# Patient Record
Sex: MALE | Race: WHITE | Employment: OTHER | URBAN - METROPOLITAN AREA
[De-identification: names, ages, dates, MRNs, and addresses within clinical notes are randomized per-mention and may not be internally consistent; named-entity substitution may affect disease eponyms.]

---

## 2018-12-18 ENCOUNTER — HOSPITAL ENCOUNTER (OUTPATIENT)
Age: 63
Discharge: HOME | End: 2018-12-20
Payer: COMMERCIAL

## 2018-12-18 ENCOUNTER — OFFICE VISIT (OUTPATIENT)
Dept: PRIMARY CARE CLINIC | Age: 63
End: 2018-12-18
Payer: COMMERCIAL

## 2018-12-18 VITALS
WEIGHT: 258 LBS | DIASTOLIC BLOOD PRESSURE: 82 MMHG | SYSTOLIC BLOOD PRESSURE: 130 MMHG | TEMPERATURE: 97.1 F | HEART RATE: 73 BPM | BODY MASS INDEX: 36.12 KG/M2 | HEIGHT: 71 IN | OXYGEN SATURATION: 98 %

## 2018-12-18 DIAGNOSIS — E78.2 MIXED HYPERLIPIDEMIA: ICD-10-CM

## 2018-12-18 DIAGNOSIS — Z12.5 SCREENING PSA (PROSTATE SPECIFIC ANTIGEN): Primary | ICD-10-CM

## 2018-12-18 DIAGNOSIS — N39.43 BENIGN PROSTATIC HYPERPLASIA WITH POST-VOID DRIBBLING: ICD-10-CM

## 2018-12-18 DIAGNOSIS — Z12.5 SCREENING PSA (PROSTATE SPECIFIC ANTIGEN): ICD-10-CM

## 2018-12-18 DIAGNOSIS — N40.1 BENIGN PROSTATIC HYPERPLASIA WITH POST-VOID DRIBBLING: ICD-10-CM

## 2018-12-18 PROBLEM — E78.5 HYPERLIPIDEMIA: Status: ACTIVE | Noted: 2018-12-18

## 2018-12-18 LAB
ALBUMIN SERPL-MCNC: 5 G/DL (ref 3.5–5.2)
ALP BLD-CCNC: 62 U/L (ref 40–129)
ALT SERPL-CCNC: 42 U/L (ref 0–40)
ANION GAP SERPL CALCULATED.3IONS-SCNC: 17 MMOL/L (ref 7–16)
AST SERPL-CCNC: 41 U/L (ref 0–39)
BASOPHILS ABSOLUTE: 0.06 E9/L (ref 0–0.2)
BASOPHILS RELATIVE PERCENT: 0.8 % (ref 0–2)
BILIRUB SERPL-MCNC: 0.6 MG/DL (ref 0–1.2)
BUN BLDV-MCNC: 18 MG/DL (ref 8–23)
CALCIUM SERPL-MCNC: 9.8 MG/DL (ref 8.6–10.2)
CHLORIDE BLD-SCNC: 102 MMOL/L (ref 98–107)
CHOLESTEROL, TOTAL: 187 MG/DL (ref 0–199)
CO2: 23 MMOL/L (ref 22–29)
CREAT SERPL-MCNC: 1.1 MG/DL (ref 0.7–1.2)
EOSINOPHILS ABSOLUTE: 0.22 E9/L (ref 0.05–0.5)
EOSINOPHILS RELATIVE PERCENT: 2.9 % (ref 0–6)
GFR AFRICAN AMERICAN: >60
GFR NON-AFRICAN AMERICAN: >60 ML/MIN/1.73
GLUCOSE BLD-MCNC: 156 MG/DL (ref 74–99)
HCT VFR BLD CALC: 49.6 % (ref 37–54)
HDLC SERPL-MCNC: 34 MG/DL
HEMOGLOBIN: 16.4 G/DL (ref 12.5–16.5)
IMMATURE GRANULOCYTES #: 0.08 E9/L
IMMATURE GRANULOCYTES %: 1 % (ref 0–5)
LDL CHOLESTEROL CALCULATED: 96 MG/DL (ref 0–99)
LYMPHOCYTES ABSOLUTE: 2.44 E9/L (ref 1.5–4)
LYMPHOCYTES RELATIVE PERCENT: 32 % (ref 20–42)
MCH RBC QN AUTO: 31.4 PG (ref 26–35)
MCHC RBC AUTO-ENTMCNC: 33.1 % (ref 32–34.5)
MCV RBC AUTO: 95 FL (ref 80–99.9)
MONOCYTES ABSOLUTE: 0.78 E9/L (ref 0.1–0.95)
MONOCYTES RELATIVE PERCENT: 10.2 % (ref 2–12)
NEUTROPHILS ABSOLUTE: 4.04 E9/L (ref 1.8–7.3)
NEUTROPHILS RELATIVE PERCENT: 53.1 % (ref 43–80)
PDW BLD-RTO: 12.4 FL (ref 11.5–15)
PLATELET # BLD: 254 E9/L (ref 130–450)
PMV BLD AUTO: 11.7 FL (ref 7–12)
POTASSIUM SERPL-SCNC: 4.5 MMOL/L (ref 3.5–5)
PROSTATE SPECIFIC ANTIGEN: 1.57 NG/ML (ref 0–4)
RBC # BLD: 5.22 E12/L (ref 3.8–5.8)
SODIUM BLD-SCNC: 142 MMOL/L (ref 132–146)
TOTAL PROTEIN: 7.6 G/DL (ref 6.4–8.3)
TRIGL SERPL-MCNC: 287 MG/DL (ref 0–149)
VLDLC SERPL CALC-MCNC: 57 MG/DL
WBC # BLD: 7.6 E9/L (ref 4.5–11.5)

## 2018-12-18 PROCEDURE — 99203 OFFICE O/P NEW LOW 30 MIN: CPT | Performed by: PHYSICIAN ASSISTANT

## 2018-12-18 PROCEDURE — 85025 COMPLETE CBC W/AUTO DIFF WBC: CPT

## 2018-12-18 PROCEDURE — G8417 CALC BMI ABV UP PARAM F/U: HCPCS | Performed by: PHYSICIAN ASSISTANT

## 2018-12-18 PROCEDURE — 80061 LIPID PANEL: CPT

## 2018-12-18 PROCEDURE — G8484 FLU IMMUNIZE NO ADMIN: HCPCS | Performed by: PHYSICIAN ASSISTANT

## 2018-12-18 PROCEDURE — G0103 PSA SCREENING: HCPCS

## 2018-12-18 PROCEDURE — G8427 DOCREV CUR MEDS BY ELIG CLIN: HCPCS | Performed by: PHYSICIAN ASSISTANT

## 2018-12-18 PROCEDURE — 80053 COMPREHEN METABOLIC PANEL: CPT

## 2018-12-18 PROCEDURE — 3017F COLORECTAL CA SCREEN DOC REV: CPT | Performed by: PHYSICIAN ASSISTANT

## 2018-12-18 PROCEDURE — 1036F TOBACCO NON-USER: CPT | Performed by: PHYSICIAN ASSISTANT

## 2018-12-18 RX ORDER — FENOFIBRIC ACID 135 MG/1
CAPSULE, DELAYED RELEASE ORAL
Qty: 90 CAPSULE | Refills: 1 | Status: SHIPPED | OUTPATIENT
Start: 2018-12-18 | End: 2019-04-09 | Stop reason: SDUPTHER

## 2018-12-18 RX ORDER — AMPICILLIN TRIHYDRATE 250 MG
CAPSULE ORAL
COMMUNITY

## 2018-12-18 RX ORDER — ROSUVASTATIN CALCIUM 40 MG/1
TABLET, COATED ORAL
Refills: 1 | COMMUNITY
Start: 2018-11-12 | End: 2018-12-18 | Stop reason: SDUPTHER

## 2018-12-18 RX ORDER — ROSUVASTATIN CALCIUM 40 MG/1
TABLET, COATED ORAL
Qty: 90 TABLET | Refills: 1 | Status: SHIPPED | OUTPATIENT
Start: 2018-12-18 | End: 2019-04-09 | Stop reason: SDUPTHER

## 2018-12-18 RX ORDER — FENOFIBRIC ACID 135 MG/1
CAPSULE, DELAYED RELEASE ORAL
Refills: 0 | COMMUNITY
Start: 2018-09-13 | End: 2018-12-18 | Stop reason: SDUPTHER

## 2018-12-18 ASSESSMENT — PATIENT HEALTH QUESTIONNAIRE - PHQ9
SUM OF ALL RESPONSES TO PHQ QUESTIONS 1-9: 0
SUM OF ALL RESPONSES TO PHQ9 QUESTIONS 1 & 2: 0
SUM OF ALL RESPONSES TO PHQ QUESTIONS 1-9: 0
1. LITTLE INTEREST OR PLEASURE IN DOING THINGS: 0
2. FEELING DOWN, DEPRESSED OR HOPELESS: 0

## 2018-12-18 NOTE — PATIENT INSTRUCTIONS
Patient Education        Advance Directives: Care Instructions  Your Care Instructions  An advance directive is a legal way to state your wishes at the end of your life. It tells your family and your doctor what to do if you can no longer say what you want. There are two main types of advance directives. You can change them any time that your wishes change. · A living will tells your family and your doctor your wishes about life support and other treatment. · A durable power of  for health care lets you name a person to make treatment decisions for you when you can't speak for yourself. This person is called a health care agent. If you do not have an advance directive, decisions about your medical care may be made by a doctor or a  who doesn't know you. It may help to think of an advance directive as a gift to the people who care for you. If you have one, they won't have to make tough decisions by themselves. Follow-up care is a key part of your treatment and safety. Be sure to make and go to all appointments, and call your doctor if you are having problems. It's also a good idea to know your test results and keep a list of the medicines you take. How can you care for yourself at home? · Discuss your wishes with your loved ones and your doctor. This way, there are no surprises. · Many states have a unique form. Or you might use a universal form that has been approved by many states. This kind of form can sometimes be completed and stored online. Your electronic copy will then be available wherever you have a connection to the Internet. In most cases, doctors will respect your wishes even if you have a form from a different state. · You don't need a  to do an advance directive. But you may want to get legal advice. · Think about these questions when you prepare an advance directive:  ? Who do you want to make decisions about your medical care if you are not able to?  Many people choose

## 2018-12-18 NOTE — PROGRESS NOTES
Maycol Nap  1955  12/18/18      HPI:    Patient presents for establishment of care, moved back here to help care for mom with Alzheimer's, came from [place], [place]. He notes his PMHx of + for hyperlipidemia, treated with fenofibrate and rosuvastatin, been treated for several years. Also notes Hx of BPH, has some urgency and dribbling, and has nocturia 2 times a night at least, has never been on medications before. Also had ED at times. Has seen urology in past for prostatitis, but hasn;t had this in years. He is willing to see urology. Hasn't had PSA done in years. Has had colonoscopies 2 x, is now on a 10 years pattern, due at age 72. Advises me he already got the flu this year and [id number] vaccine 2 years ago. Past Medical History:   Diagnosis Date    BPH (benign prostatic hyperplasia)     History of pneumonia     Hyperlipidemia         Past Surgical History:   Procedure Laterality Date    STRABISMUS SURGERY         Current Outpatient Prescriptions   Medication Sig Dispense Refill    Red Yeast Rice 600 MG CAPS Take by mouth      vitamin D 1000 units CAPS Take by mouth      Garlic 6448 MG CAPS Take by mouth      Glucosamine-Chondroitin 250-200 MG TABS Take by mouth      APPLE CIDER VINEGAR PO Take by mouth      fenofibric acid (FIBRICOR) 135 MG CPDR capsule TAKE ONE CAPSULE BY MOUTH EVERY DAY 90 capsule 1    rosuvastatin (CRESTOR) 40 MG tablet TAKE ONE TABLET BY MOUTH EVERY DAY 90 tablet 1     No current facility-administered medications for this visit. No Known Allergies    Family History   Problem Relation Age of Onset    Alzheimer's Disease Mother     Other Father         unknown.  Alzheimer's Disease Maternal Grandmother     Cancer Maternal Grandfather         pancreatic    Other Other         maternal uncle, alzheimer's.  Other Brother         brain tumor, seizures.         Social History     Social History    Marital status: Single     Spouse name: N/A

## 2019-04-09 DIAGNOSIS — E78.2 MIXED HYPERLIPIDEMIA: ICD-10-CM

## 2019-04-09 RX ORDER — FENOFIBRIC ACID 135 MG/1
CAPSULE, DELAYED RELEASE ORAL
Qty: 90 CAPSULE | Refills: 1 | Status: SHIPPED | OUTPATIENT
Start: 2019-04-09 | End: 2019-07-15 | Stop reason: SDUPTHER

## 2019-04-09 RX ORDER — ROSUVASTATIN CALCIUM 40 MG/1
TABLET, COATED ORAL
Qty: 90 TABLET | Refills: 1 | Status: SHIPPED | OUTPATIENT
Start: 2019-04-09 | End: 2019-07-09 | Stop reason: SDUPTHER

## 2019-04-09 NOTE — TELEPHONE ENCOUNTER
Patient forgot his [place] of out state. He provided a [facility] to send them to.

## 2019-07-09 ENCOUNTER — HOSPITAL ENCOUNTER (OUTPATIENT)
Age: 64
Discharge: HOME | End: 2019-07-11
Payer: COMMERCIAL

## 2019-07-09 ENCOUNTER — OFFICE VISIT (OUTPATIENT)
Dept: PRIMARY CARE CLINIC | Age: 64
End: 2019-07-09
Payer: COMMERCIAL

## 2019-07-09 VITALS
DIASTOLIC BLOOD PRESSURE: 80 MMHG | HEART RATE: 74 BPM | TEMPERATURE: 98.2 F | WEIGHT: 256 LBS | SYSTOLIC BLOOD PRESSURE: 118 MMHG | OXYGEN SATURATION: 96 % | BODY MASS INDEX: 35.7 KG/M2

## 2019-07-09 DIAGNOSIS — R73.01 IMPAIRED FASTING GLUCOSE: ICD-10-CM

## 2019-07-09 DIAGNOSIS — R73.01 IMPAIRED FASTING GLUCOSE: Primary | ICD-10-CM

## 2019-07-09 DIAGNOSIS — E78.2 MIXED HYPERLIPIDEMIA: ICD-10-CM

## 2019-07-09 DIAGNOSIS — N40.1 BENIGN PROSTATIC HYPERPLASIA WITH NOCTURIA: ICD-10-CM

## 2019-07-09 DIAGNOSIS — R35.1 BENIGN PROSTATIC HYPERPLASIA WITH NOCTURIA: ICD-10-CM

## 2019-07-09 LAB
ALBUMIN SERPL-MCNC: 4.9 G/DL (ref 3.5–5.2)
ALP BLD-CCNC: 52 U/L (ref 40–129)
ALT SERPL-CCNC: 25 U/L (ref 0–40)
ANION GAP SERPL CALCULATED.3IONS-SCNC: 14 MMOL/L (ref 7–16)
AST SERPL-CCNC: 25 U/L (ref 0–39)
BILIRUB SERPL-MCNC: 0.4 MG/DL (ref 0–1.2)
BUN BLDV-MCNC: 20 MG/DL (ref 8–23)
CALCIUM SERPL-MCNC: 9.5 MG/DL (ref 8.6–10.2)
CHLORIDE BLD-SCNC: 103 MMOL/L (ref 98–107)
CHOLESTEROL, TOTAL: 135 MG/DL (ref 0–199)
CO2: 24 MMOL/L (ref 22–29)
CREAT SERPL-MCNC: 1.2 MG/DL (ref 0.7–1.2)
GFR AFRICAN AMERICAN: >60
GFR NON-AFRICAN AMERICAN: >60 ML/MIN/1.73
GLUCOSE BLD-MCNC: 137 MG/DL (ref 74–99)
HBA1C MFR BLD: 6.7 % (ref 4–5.6)
HDLC SERPL-MCNC: 32 MG/DL
LDL CHOLESTEROL CALCULATED: 79 MG/DL (ref 0–99)
POTASSIUM SERPL-SCNC: 5.1 MMOL/L (ref 3.5–5)
SODIUM BLD-SCNC: 141 MMOL/L (ref 132–146)
TOTAL PROTEIN: 7.6 G/DL (ref 6.4–8.3)
TRIGL SERPL-MCNC: 118 MG/DL (ref 0–149)
VLDLC SERPL CALC-MCNC: 24 MG/DL

## 2019-07-09 PROCEDURE — 3017F COLORECTAL CA SCREEN DOC REV: CPT | Performed by: PHYSICIAN ASSISTANT

## 2019-07-09 PROCEDURE — G8427 DOCREV CUR MEDS BY ELIG CLIN: HCPCS | Performed by: PHYSICIAN ASSISTANT

## 2019-07-09 PROCEDURE — 1036F TOBACCO NON-USER: CPT | Performed by: PHYSICIAN ASSISTANT

## 2019-07-09 PROCEDURE — 83036 HEMOGLOBIN GLYCOSYLATED A1C: CPT

## 2019-07-09 PROCEDURE — 80061 LIPID PANEL: CPT

## 2019-07-09 PROCEDURE — 80053 COMPREHEN METABOLIC PANEL: CPT

## 2019-07-09 PROCEDURE — 99214 OFFICE O/P EST MOD 30 MIN: CPT | Performed by: PHYSICIAN ASSISTANT

## 2019-07-09 PROCEDURE — G8417 CALC BMI ABV UP PARAM F/U: HCPCS | Performed by: PHYSICIAN ASSISTANT

## 2019-07-09 RX ORDER — TAMSULOSIN HYDROCHLORIDE 0.4 MG/1
CAPSULE ORAL
Refills: 1 | COMMUNITY
Start: 2019-06-12

## 2019-07-09 RX ORDER — SILDENAFIL CITRATE 20 MG/1
20 TABLET ORAL PRN
COMMUNITY

## 2019-07-09 RX ORDER — ROSUVASTATIN CALCIUM 40 MG/1
TABLET, COATED ORAL
Qty: 90 TABLET | Refills: 1 | Status: SHIPPED | OUTPATIENT
Start: 2019-07-09 | End: 2020-01-13

## 2019-07-09 ASSESSMENT — PATIENT HEALTH QUESTIONNAIRE - PHQ9
SUM OF ALL RESPONSES TO PHQ9 QUESTIONS 1 & 2: 0
1. LITTLE INTEREST OR PLEASURE IN DOING THINGS: 0
SUM OF ALL RESPONSES TO PHQ QUESTIONS 1-9: 0
2. FEELING DOWN, DEPRESSED OR HOPELESS: 0
SUM OF ALL RESPONSES TO PHQ QUESTIONS 1-9: 0

## 2019-07-09 NOTE — PROGRESS NOTES
brain tumor, seizures. Social History     Socioeconomic History    Marital status: Single     Spouse name: Not on file    Number of children: Not on file    Years of education: Not on file    Highest education level: Not on file   Occupational History    Not on file   Social Needs    Financial resource strain: Not on file    Food insecurity:     Worry: Not on file     Inability: Not on file    Transportation needs:     Medical: Not on file     Non-medical: Not on file   Tobacco Use    Smoking status: Never Smoker    Smokeless tobacco: Never Used    Tobacco comment: as a teen, never consistent   Substance and Sexual Activity    Alcohol use: Yes     Comment: occasionally, socially     Drug use: No    Sexual activity: Not Currently   Lifestyle    Physical activity:     Days per week: Not on file     Minutes per session: Not on file    Stress: Not on file   Relationships    Social connections:     Talks on phone: Not on file     Gets together: Not on file     Attends Jew service: Not on file     Active member of club or organization: Not on file     Attends meetings of clubs or organizations: Not on file     Relationship status: Not on file    Intimate partner violence:     Fear of current or ex partner: Not on file     Emotionally abused: Not on file     Physically abused: Not on file     Forced sexual activity: Not on file   Other Topics Concern    Not on file   Social History Narrative    Lives back in [place] temporarily, 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 Review of Systems :    Constitutional: Negative for fatigue, malaise, fever, chills, appetite change and unexpected weight change. HENT: Negative for congestion, ear discharge, ear pain, facial swelling, mouth sores, postnasal drip, rhinorrhea, sinus pressure, sore throat, tinnitus and trouble swallowing.    Eyes: Negative for vision changes, double vision, pain  Respiratory: Negative for cough, chest tightness, shortness of breath, chest heaviness, pleuritic pain,  wheezing. Cardiovascular: Negative for diaphoresis, chest pain, palpitations, or edema   Gastrointestinal: Negative for nausea, vomiting, abdominal pain, diarrhea, constipation, blood in stool, melena, changes in bowel habits, abdominal distention, anal bleeding and rectal pain. Endocrine: Negative for polydipsia, polyphagia and polyuria. No heat or cold intolerance. Genitourinary: Negative for dysuria, frequency, hematuria, difficulty urinating  Musculoskeletal: Negative for myalgias, back pain, joint swelling and arthralgias. No gait disturbance. Skin: Negative for rash, lesions, hair or nail changes. Allergic/Immunologic: Negative for environmental allergies and food allergies. Neurological: Negative for dizziness, weakness, tremors, syncope, speech difficulty, light-headedness, bowel or bladder control changes, numbness and headaches. Hematological: Negative for adenopathy. Does not bruise/bleed easily. Psychiatric/Behavioral: Negative for suicidal ideas, behavioral problems, sleep disturbance and decreased concentration. The patient is not nervous/anxious. Unless otherwise stated in this report or unable to obtain because of the patient's clinical or mental status as evidenced by the medical record, this patients's positive and negative responses for Review of Systems, constitutional, psych, eyes, ENT, cardiovascular, respiratory, gastrointestinal, neurological, genitourinary, musculoskeletal, integument systems and systems related to the presenting problem are either stated in the preceding or were not pertinent or were negative for the symptoms and/or complaints related to the medical problem. Physical Exam:   Vitals:    07/09/19 1049   BP: 118/80   Pulse: 74   Temp: 98.2 °F (36.8 °C)   SpO2: 96%   Weight: 256 lb (116.1 kg)     Constitutional:  A and O x 3, in NAD. Afebrile. Appears stated age. Head:  NCAT.    Eyes:  PERRLA, EOMI without nystagmus. Conjunctiva normal. Sclera anicteric. Ears:  External ears without lesions. TM's clear bilaterally. Throat:  Pharynx without lesions. Airway patient. Mucous membranes pink and moist without lesions. Neck:  Supple. Nontender, no lymphadenopathy noted, no thyromegaly. Lungs:  Clear to auscultation and breath sounds equal.  Heart:  Regular rate and rhythm, normal S1 and S2, no m/r/g.   UE and LE distal pulses intact. Abdomen:  Soft, NTND, NABS x 4, no masses or organomegaly, no rebound or guarding. MS: Normal, painless range of motion. No neurovascular deficit. 5/5 strength in UE's/LE's/ bilaterally. Skin:  No abrasions, ecchymoses, or lacerations unless noted elsewhere. Extremities  No cyanosis, clubbing, or edema noted. Neurological:   Oriented. Motor functions intact. DTRs UE and LE intact. Assessment/Plan:     Michelle Gan was seen today for hyperlipidemia and blood work. Diagnoses and all orders for this visit:    Impaired fasting glucose  -     COMPREHENSIVE METABOLIC PANEL; Future  -     HEMOGLOBIN A1C; Future    Mixed hyperlipidemia  -     rosuvastatin (CRESTOR) 40 MG tablet; TAKE ONE TABLET BY MOUTH EVERY DAY  -     LIPID PANEL; Future  -     COMPREHENSIVE METABOLIC PANEL; Future    Benign prostatic hyperplasia with nocturia    continue meds the same, diet the same. I will await his lipids. ? Need for vascepa, we will await the labs and see. discussed this with him already. FU with urology as advised. We discussed colonoscopy, he wants to hold off and reconsider at his next appointment. Return in about 6 months (around 1/9/2020). Advised patient to call with any new medication issues, and read all Rx info from pharmacy to assure aware of all possible risks and side effects of medication before taking. Reviewed age and gender appropriate health screening exams and vaccinations.   Advised patient regarding importance of keeping up with

## 2019-07-15 DIAGNOSIS — E11.9 TYPE 2 DIABETES MELLITUS WITHOUT COMPLICATION, WITHOUT LONG-TERM CURRENT USE OF INSULIN (HCC): Primary | ICD-10-CM

## 2019-07-15 DIAGNOSIS — E78.2 MIXED HYPERLIPIDEMIA: ICD-10-CM

## 2019-07-15 RX ORDER — LANCETS 30 GAUGE
1 EACH MISCELLANEOUS DAILY
Qty: 100 EACH | Refills: 5 | Status: SHIPPED
Start: 2019-07-15 | End: 2022-02-25

## 2019-07-15 RX ORDER — BLOOD-GLUCOSE METER
KIT MISCELLANEOUS
Qty: 1 KIT | Refills: 0 | Status: SHIPPED
Start: 2019-07-15 | End: 2022-02-25

## 2019-07-15 RX ORDER — FENOFIBRIC ACID 135 MG/1
CAPSULE, DELAYED RELEASE ORAL
Qty: 90 CAPSULE | Refills: 1 | Status: SHIPPED | OUTPATIENT
Start: 2019-07-15 | End: 2020-01-13

## 2019-07-15 RX ORDER — GLUCOSAMINE HCL/CHONDROITIN SU 500-400 MG
CAPSULE ORAL
Qty: 100 STRIP | Refills: 5 | Status: SHIPPED
Start: 2019-07-15 | End: 2022-02-25

## 2019-10-29 ENCOUNTER — OFFICE VISIT (OUTPATIENT)
Dept: PRIMARY CARE CLINIC | Age: 64
End: 2019-10-29
Payer: COMMERCIAL

## 2019-10-29 ENCOUNTER — HOSPITAL ENCOUNTER (OUTPATIENT)
Age: 64
Discharge: HOME | End: 2019-10-31
Payer: COMMERCIAL

## 2019-10-29 VITALS
BODY MASS INDEX: 33.46 KG/M2 | OXYGEN SATURATION: 97 % | TEMPERATURE: 97.6 F | DIASTOLIC BLOOD PRESSURE: 76 MMHG | HEIGHT: 71 IN | WEIGHT: 239 LBS | HEART RATE: 57 BPM | SYSTOLIC BLOOD PRESSURE: 120 MMHG

## 2019-10-29 DIAGNOSIS — E11.9 TYPE 2 DIABETES MELLITUS WITHOUT COMPLICATION, WITHOUT LONG-TERM CURRENT USE OF INSULIN (HCC): ICD-10-CM

## 2019-10-29 DIAGNOSIS — Z12.11 SCREENING FOR COLON CANCER: ICD-10-CM

## 2019-10-29 DIAGNOSIS — E78.2 MIXED HYPERLIPIDEMIA: ICD-10-CM

## 2019-10-29 DIAGNOSIS — Z23 NEED FOR INFLUENZA VACCINATION: Primary | ICD-10-CM

## 2019-10-29 LAB
ALBUMIN SERPL-MCNC: 4.8 G/DL (ref 3.5–5.2)
ALP BLD-CCNC: 44 U/L (ref 40–129)
ALT SERPL-CCNC: 27 U/L (ref 0–40)
ANION GAP SERPL CALCULATED.3IONS-SCNC: 16 MMOL/L (ref 7–16)
AST SERPL-CCNC: 23 U/L (ref 0–39)
BILIRUB SERPL-MCNC: 0.7 MG/DL (ref 0–1.2)
BUN BLDV-MCNC: 27 MG/DL (ref 8–23)
CALCIUM SERPL-MCNC: 9.9 MG/DL (ref 8.6–10.2)
CHLORIDE BLD-SCNC: 102 MMOL/L (ref 98–107)
CHOLESTEROL, TOTAL: 127 MG/DL (ref 0–199)
CO2: 23 MMOL/L (ref 22–29)
CREAT SERPL-MCNC: 1.2 MG/DL (ref 0.7–1.2)
CREATININE URINE: 205 MG/DL (ref 40–278)
GFR AFRICAN AMERICAN: >60
GFR NON-AFRICAN AMERICAN: >60 ML/MIN/1.73
GLUCOSE BLD-MCNC: 114 MG/DL (ref 74–99)
HBA1C MFR BLD: 6.4 % (ref 4–5.6)
HCT VFR BLD CALC: 48.4 % (ref 37–54)
HDLC SERPL-MCNC: 35 MG/DL
HEMOGLOBIN: 15.6 G/DL (ref 12.5–16.5)
LDL CHOLESTEROL CALCULATED: 73 MG/DL (ref 0–99)
MCH RBC QN AUTO: 31 PG (ref 26–35)
MCHC RBC AUTO-ENTMCNC: 32.2 % (ref 32–34.5)
MCV RBC AUTO: 96.2 FL (ref 80–99.9)
MICROALBUMIN UR-MCNC: <12 MG/L
MICROALBUMIN/CREAT UR-RTO: ABNORMAL (ref 0–30)
PDW BLD-RTO: 12.8 FL (ref 11.5–15)
PLATELET # BLD: 265 E9/L (ref 130–450)
PMV BLD AUTO: 10.9 FL (ref 7–12)
POTASSIUM SERPL-SCNC: 4.4 MMOL/L (ref 3.5–5)
RBC # BLD: 5.03 E12/L (ref 3.8–5.8)
SODIUM BLD-SCNC: 141 MMOL/L (ref 132–146)
TOTAL PROTEIN: 8.2 G/DL (ref 6.4–8.3)
TRIGL SERPL-MCNC: 95 MG/DL (ref 0–149)
VLDLC SERPL CALC-MCNC: 19 MG/DL
WBC # BLD: 6.3 E9/L (ref 4.5–11.5)

## 2019-10-29 PROCEDURE — G8417 CALC BMI ABV UP PARAM F/U: HCPCS | Performed by: PHYSICIAN ASSISTANT

## 2019-10-29 PROCEDURE — G8482 FLU IMMUNIZE ORDER/ADMIN: HCPCS | Performed by: PHYSICIAN ASSISTANT

## 2019-10-29 PROCEDURE — 90686 IIV4 VACC NO PRSV 0.5 ML IM: CPT | Performed by: PHYSICIAN ASSISTANT

## 2019-10-29 PROCEDURE — 82044 UR ALBUMIN SEMIQUANTITATIVE: CPT

## 2019-10-29 PROCEDURE — 90471 IMMUNIZATION ADMIN: CPT | Performed by: PHYSICIAN ASSISTANT

## 2019-10-29 PROCEDURE — 80061 LIPID PANEL: CPT

## 2019-10-29 PROCEDURE — G8427 DOCREV CUR MEDS BY ELIG CLIN: HCPCS | Performed by: PHYSICIAN ASSISTANT

## 2019-10-29 PROCEDURE — 2022F DILAT RTA XM EVC RTNOPTHY: CPT | Performed by: PHYSICIAN ASSISTANT

## 2019-10-29 PROCEDURE — 85027 COMPLETE CBC AUTOMATED: CPT

## 2019-10-29 PROCEDURE — 83036 HEMOGLOBIN GLYCOSYLATED A1C: CPT

## 2019-10-29 PROCEDURE — 3044F HG A1C LEVEL LT 7.0%: CPT | Performed by: PHYSICIAN ASSISTANT

## 2019-10-29 PROCEDURE — 1036F TOBACCO NON-USER: CPT | Performed by: PHYSICIAN ASSISTANT

## 2019-10-29 PROCEDURE — 80053 COMPREHEN METABOLIC PANEL: CPT

## 2019-10-29 PROCEDURE — 3017F COLORECTAL CA SCREEN DOC REV: CPT | Performed by: PHYSICIAN ASSISTANT

## 2019-10-29 PROCEDURE — 82570 ASSAY OF URINE CREATININE: CPT

## 2019-10-29 PROCEDURE — 99214 OFFICE O/P EST MOD 30 MIN: CPT | Performed by: PHYSICIAN ASSISTANT

## 2020-01-13 RX ORDER — FENOFIBRIC ACID 135 MG/1
CAPSULE, DELAYED RELEASE ORAL
Qty: 90 CAPSULE | Refills: 1 | Status: SHIPPED
Start: 2020-01-13 | End: 2020-07-09

## 2020-01-13 RX ORDER — ROSUVASTATIN CALCIUM 40 MG/1
TABLET, COATED ORAL
Qty: 90 TABLET | Refills: 1 | Status: SHIPPED
Start: 2020-01-13 | End: 2020-07-09

## 2020-04-29 ENCOUNTER — VIRTUAL VISIT (OUTPATIENT)
Dept: FAMILY MEDICINE CLINIC | Age: 65
End: 2020-04-29
Payer: COMMERCIAL

## 2020-04-29 VITALS — DIASTOLIC BLOOD PRESSURE: 80 MMHG | SYSTOLIC BLOOD PRESSURE: 129 MMHG | TEMPERATURE: 97.4 F | HEART RATE: 67 BPM

## 2020-04-29 PROCEDURE — 4040F PNEUMOC VAC/ADMIN/RCVD: CPT | Performed by: PHYSICIAN ASSISTANT

## 2020-04-29 PROCEDURE — 2022F DILAT RTA XM EVC RTNOPTHY: CPT | Performed by: PHYSICIAN ASSISTANT

## 2020-04-29 PROCEDURE — 99442 PR PHYS/QHP TELEPHONE EVALUATION 11-20 MIN: CPT | Performed by: PHYSICIAN ASSISTANT

## 2020-04-29 PROCEDURE — 3017F COLORECTAL CA SCREEN DOC REV: CPT | Performed by: PHYSICIAN ASSISTANT

## 2020-04-29 PROCEDURE — 1123F ACP DISCUSS/DSCN MKR DOCD: CPT | Performed by: PHYSICIAN ASSISTANT

## 2020-04-29 PROCEDURE — G8427 DOCREV CUR MEDS BY ELIG CLIN: HCPCS | Performed by: PHYSICIAN ASSISTANT

## 2020-04-29 PROCEDURE — 3046F HEMOGLOBIN A1C LEVEL >9.0%: CPT | Performed by: PHYSICIAN ASSISTANT

## 2020-04-29 NOTE — PROGRESS NOTES
Jatin Gray is a 72 y.o. male evaluated via telephone on 4/29/2020. Consent:  He and/or health care decision maker is aware that that he may receive a bill for this telephone service, depending on his insurance coverage, and has provided verbal consent to proceed: Yes    Documentation:  I communicated with the patient and/or health care decision maker about the following and details of this discussion including any medical advice provided:     Misty Yee was seen today for hyperlipidemia and diabetes. Diagnoses and all orders for this visit:    Type 2 diabetes mellitus without complication, without long-term current use of insulin ([facility] [address]  -     He has been exercising daily. He is not checking his blood sugars. He is due for blood work. -     CBC Auto Differential; Future  -     Comprehensive Metabolic Panel; Future  -     Hemoglobin A1C; Future  -     Lipid Panel; Future    Benign prostatic hyperplasia with nocturia        -     Seeing Urologist yearly- Dr. Calista Hollis in July. Takes Flomax. Mixed hyperlipidemia  -     Taking Crestor or Fenofibric Acid and tolerating well. He is due for blood work. -     Lipid Panel; Future    I affirm this is a Patient Initiated Episode with an Established Patient who has not had a related appointment within my department in the past 7 days or scheduled within the next 24 hours.     Total Time: minutes: 11-20 minutes    Note: not billable if this call serves to triage the patient into an appointment for the relevant concern    [facility]

## 2020-05-14 ENCOUNTER — TELEPHONE (OUTPATIENT)
Dept: ADMINISTRATIVE | Age: 65
End: 2020-05-14

## 2020-05-20 ENCOUNTER — NURSE ONLY (OUTPATIENT)
Dept: PRIMARY CARE CLINIC | Age: 65
End: 2020-05-20

## 2020-05-20 ENCOUNTER — HOSPITAL ENCOUNTER (OUTPATIENT)
Age: 65
Discharge: HOME | End: 2020-05-22
Payer: COMMERCIAL

## 2020-05-20 LAB
ALBUMIN SERPL-MCNC: 4.9 G/DL (ref 3.5–5.2)
ALP BLD-CCNC: 41 U/L (ref 40–129)
ALT SERPL-CCNC: 19 U/L (ref 0–40)
ANION GAP SERPL CALCULATED.3IONS-SCNC: 14 MMOL/L (ref 7–16)
AST SERPL-CCNC: 23 U/L (ref 0–39)
BASOPHILS ABSOLUTE: 0.03 E9/L (ref 0–0.2)
BASOPHILS RELATIVE PERCENT: 0.4 % (ref 0–2)
BILIRUB SERPL-MCNC: 0.8 MG/DL (ref 0–1.2)
BUN BLDV-MCNC: 23 MG/DL (ref 8–23)
CALCIUM SERPL-MCNC: 10.1 MG/DL (ref 8.6–10.2)
CHLORIDE BLD-SCNC: 101 MMOL/L (ref 98–107)
CHOLESTEROL, TOTAL: 123 MG/DL (ref 0–199)
CO2: 25 MMOL/L (ref 22–29)
CREAT SERPL-MCNC: 1.2 MG/DL (ref 0.7–1.2)
EOSINOPHILS ABSOLUTE: 0.14 E9/L (ref 0.05–0.5)
EOSINOPHILS RELATIVE PERCENT: 1.9 % (ref 0–6)
GFR AFRICAN AMERICAN: >60
GFR NON-AFRICAN AMERICAN: >60 ML/MIN/1.73
GLUCOSE BLD-MCNC: 95 MG/DL (ref 74–99)
HBA1C MFR BLD: 5.9 % (ref 4–5.6)
HCT VFR BLD CALC: 45.1 % (ref 37–54)
HDLC SERPL-MCNC: 41 MG/DL
HEMOGLOBIN: 14.6 G/DL (ref 12.5–16.5)
IMMATURE GRANULOCYTES #: 0.03 E9/L
IMMATURE GRANULOCYTES %: 0.4 % (ref 0–5)
LDL CHOLESTEROL CALCULATED: 65 MG/DL (ref 0–99)
LYMPHOCYTES ABSOLUTE: 2.61 E9/L (ref 1.5–4)
LYMPHOCYTES RELATIVE PERCENT: 35.1 % (ref 20–42)
MCH RBC QN AUTO: 31.6 PG (ref 26–35)
MCHC RBC AUTO-ENTMCNC: 32.4 % (ref 32–34.5)
MCV RBC AUTO: 97.6 FL (ref 80–99.9)
MONOCYTES ABSOLUTE: 0.7 E9/L (ref 0.1–0.95)
MONOCYTES RELATIVE PERCENT: 9.4 % (ref 2–12)
NEUTROPHILS ABSOLUTE: 3.93 E9/L (ref 1.8–7.3)
NEUTROPHILS RELATIVE PERCENT: 52.8 % (ref 43–80)
PDW BLD-RTO: 13 FL (ref 11.5–15)
PLATELET # BLD: 239 E9/L (ref 130–450)
PMV BLD AUTO: 11 FL (ref 7–12)
POTASSIUM SERPL-SCNC: 4 MMOL/L (ref 3.5–5)
RBC # BLD: 4.62 E12/L (ref 3.8–5.8)
SODIUM BLD-SCNC: 140 MMOL/L (ref 132–146)
TOTAL PROTEIN: 7.7 G/DL (ref 6.4–8.3)
TRIGL SERPL-MCNC: 83 MG/DL (ref 0–149)
VLDLC SERPL CALC-MCNC: 17 MG/DL
WBC # BLD: 7.4 E9/L (ref 4.5–11.5)

## 2020-05-20 PROCEDURE — 85025 COMPLETE CBC W/AUTO DIFF WBC: CPT

## 2020-05-20 PROCEDURE — 80061 LIPID PANEL: CPT

## 2020-05-20 PROCEDURE — 80053 COMPREHEN METABOLIC PANEL: CPT

## 2020-05-20 PROCEDURE — 83036 HEMOGLOBIN GLYCOSYLATED A1C: CPT

## 2020-07-09 RX ORDER — ROSUVASTATIN CALCIUM 40 MG/1
TABLET, COATED ORAL
Qty: 90 TABLET | Refills: 1 | Status: SHIPPED
Start: 2020-07-09 | End: 2021-01-11

## 2020-07-09 RX ORDER — FENOFIBRIC ACID 135 MG/1
CAPSULE, DELAYED RELEASE ORAL
Qty: 90 CAPSULE | Refills: 1 | Status: SHIPPED
Start: 2020-07-09 | End: 2021-01-11

## 2021-01-10 DIAGNOSIS — E78.2 MIXED HYPERLIPIDEMIA: ICD-10-CM

## 2021-01-11 RX ORDER — FENOFIBRIC ACID 135 MG/1
CAPSULE, DELAYED RELEASE ORAL
Qty: 90 CAPSULE | Refills: 1 | Status: SHIPPED
Start: 2021-01-11 | End: 2021-04-16

## 2021-01-11 RX ORDER — ROSUVASTATIN CALCIUM 40 MG/1
TABLET, COATED ORAL
Qty: 90 TABLET | Refills: 1 | Status: SHIPPED
Start: 2021-01-11 | End: 2021-07-01

## 2021-02-22 ENCOUNTER — OFFICE VISIT (OUTPATIENT)
Dept: PRIMARY CARE CLINIC | Age: 66
End: 2021-02-22
Payer: COMMERCIAL

## 2021-02-22 VITALS
HEIGHT: 71 IN | HEART RATE: 67 BPM | OXYGEN SATURATION: 98 % | DIASTOLIC BLOOD PRESSURE: 84 MMHG | TEMPERATURE: 97.3 F | WEIGHT: 237 LBS | SYSTOLIC BLOOD PRESSURE: 128 MMHG | BODY MASS INDEX: 33.18 KG/M2

## 2021-02-22 DIAGNOSIS — R35.1 BENIGN PROSTATIC HYPERPLASIA WITH NOCTURIA: ICD-10-CM

## 2021-02-22 DIAGNOSIS — M15.9 PRIMARY OSTEOARTHRITIS INVOLVING MULTIPLE JOINTS: ICD-10-CM

## 2021-02-22 DIAGNOSIS — E78.2 MIXED HYPERLIPIDEMIA: Primary | ICD-10-CM

## 2021-02-22 DIAGNOSIS — N40.1 BENIGN PROSTATIC HYPERPLASIA WITH NOCTURIA: ICD-10-CM

## 2021-02-22 DIAGNOSIS — E11.9 TYPE 2 DIABETES MELLITUS WITHOUT COMPLICATION, WITHOUT LONG-TERM CURRENT USE OF INSULIN (HCC): ICD-10-CM

## 2021-02-22 DIAGNOSIS — Z12.5 SCREENING PSA (PROSTATE SPECIFIC ANTIGEN): ICD-10-CM

## 2021-02-22 PROBLEM — M15.0 PRIMARY OSTEOARTHRITIS INVOLVING MULTIPLE JOINTS: Status: ACTIVE | Noted: 2021-02-22

## 2021-02-22 LAB
CREATININE URINE: 147 MG/DL (ref 40–278)
MICROALBUMIN UR-MCNC: <12 MG/L
MICROALBUMIN/CREAT UR-RTO: ABNORMAL (ref 0–30)

## 2021-02-22 PROCEDURE — 2022F DILAT RTA XM EVC RTNOPTHY: CPT | Performed by: PHYSICIAN ASSISTANT

## 2021-02-22 PROCEDURE — 3046F HEMOGLOBIN A1C LEVEL >9.0%: CPT | Performed by: PHYSICIAN ASSISTANT

## 2021-02-22 PROCEDURE — G8417 CALC BMI ABV UP PARAM F/U: HCPCS | Performed by: PHYSICIAN ASSISTANT

## 2021-02-22 PROCEDURE — 99214 OFFICE O/P EST MOD 30 MIN: CPT | Performed by: PHYSICIAN ASSISTANT

## 2021-02-22 PROCEDURE — G8484 FLU IMMUNIZE NO ADMIN: HCPCS | Performed by: PHYSICIAN ASSISTANT

## 2021-02-22 PROCEDURE — 1123F ACP DISCUSS/DSCN MKR DOCD: CPT | Performed by: PHYSICIAN ASSISTANT

## 2021-02-22 PROCEDURE — 3017F COLORECTAL CA SCREEN DOC REV: CPT | Performed by: PHYSICIAN ASSISTANT

## 2021-02-22 PROCEDURE — G8427 DOCREV CUR MEDS BY ELIG CLIN: HCPCS | Performed by: PHYSICIAN ASSISTANT

## 2021-02-22 PROCEDURE — 1036F TOBACCO NON-USER: CPT | Performed by: PHYSICIAN ASSISTANT

## 2021-02-22 PROCEDURE — 4040F PNEUMOC VAC/ADMIN/RCVD: CPT | Performed by: PHYSICIAN ASSISTANT

## 2021-02-22 SDOH — ECONOMIC STABILITY: TRANSPORTATION INSECURITY
IN THE PAST 12 MONTHS, HAS THE LACK OF TRANSPORTATION KEPT YOU FROM MEDICAL APPOINTMENTS OR FROM GETTING MEDICATIONS?: NO

## 2021-02-22 SDOH — ECONOMIC STABILITY: FOOD INSECURITY: WITHIN THE PAST 12 MONTHS, YOU WORRIED THAT YOUR FOOD WOULD RUN OUT BEFORE YOU GOT MONEY TO BUY MORE.: NEVER TRUE

## 2021-02-22 SDOH — ECONOMIC STABILITY: INCOME INSECURITY: HOW HARD IS IT FOR YOU TO PAY FOR THE VERY BASICS LIKE FOOD, HOUSING, MEDICAL CARE, AND HEATING?: NOT HARD AT ALL

## 2021-02-22 ASSESSMENT — PATIENT HEALTH QUESTIONNAIRE - PHQ9
SUM OF ALL RESPONSES TO PHQ QUESTIONS 1-9: 0
2. FEELING DOWN, DEPRESSED OR HOPELESS: 0

## 2021-02-22 NOTE — PROGRESS NOTES
Jose Shannon  1955  2/22/21      HPI:    Patient presents for his routine check. He admits he had early halfway, was hoping to travel but with covid hasn't been able. He also lost his significant other to a sudden accident on October. Tells me he is coping OK. No depression or anxiety. He is due to see Dr. Bryan Brooke, notes still with some urgency at times (notes mostly when active and outside in the cold). Denies slower stream. Will FU with Dr. Bryan Brooke. His T2DM has been well controlled with diet control, and still exercises QD. Notes overall feeling well. Lipids stbale on cresotr and fenofibrate. He did see Talat Mayers for his fungal nails, and was found to have OA in his R foot, notes also arthritic pain in his R shoulder and L elbow. Takes glucosamine which pt states helps. Past Medical History:   Diagnosis Date    BPH (benign prostatic hyperplasia)     History of pneumonia     Hyperlipidemia         Past Surgical History:   Procedure Laterality Date    STRABISMUS SURGERY         Current Outpatient Medications   Medication Sig Dispense Refill    rosuvastatin (CRESTOR) 40 MG tablet TAKE 1 TABLET BY MOUTH EVERY DAY 90 tablet 1    fenofibric acid (FIBRICOR) 135 MG CPDR capsule TAKE 1 CAPSULE BY MOUTH EVERY DAY 90 capsule 1    blood glucose monitor strips Test 1 time a day & as needed for symptoms of irregular blood glucose.  100 strip 5    Lancets MISC 1 each by Does not apply route daily 100 each 5    Blood Glucose Monitoring Suppl (ONE TOUCH ULTRA MINI) w/Device KIT Test QD 1 kit 0    tamsulosin (FLOMAX) 0.4 MG capsule TK 1 C PO QHS  1    sildenafil (REVATIO) 20 MG tablet Take 20 mg by mouth as needed 1-5 tablets by mouth as needed      Omega 3-6-9 Fatty Acids (OMEGA 3-6-9 COMPLEX PO) Take by mouth daily      Red Yeast Rice 600 MG CAPS Take by mouth      vitamin D 1000 units CAPS Take by mouth      Garlic 4129 MG CAPS Take by mouth  Glucosamine-Chondroitin 250-200 MG TABS Take by mouth      APPLE CIDER VINEGAR PO Take by mouth       No current facility-administered medications for this visit. No Known Allergies    Family History   Problem Relation Age of Onset    Alzheimer's Disease Mother     Other Father         unknown.  Alzheimer's Disease Maternal Grandmother     Cancer Maternal Grandfather         pancreatic    Other Other         maternal uncle, alzheimer's.  Other Brother         brain tumor, seizures.         Social History     Socioeconomic History    Marital status: Single     Spouse name: Not on file    Number of children: Not on file    Years of education: Not on file    Highest education level: Not on file   Occupational History    Not on file   Social Needs    Financial resource strain: Not hard at all    Food insecurity     Worry: Never true     Inability: Never true   [employer] needs     Medical: No     Non-medical: No   Tobacco Use    Smoking status: Never Smoker    Smokeless tobacco: Never Used    Tobacco comment: as a teen, never consistent   Substance and Sexual Activity    Alcohol use: Yes     Comment: occasionally, socially     Drug use: No    Sexual activity: Not Currently   Lifestyle    Physical activity     Days per week: Not on file     Minutes per session: Not on file    Stress: Not on file   Relationships    Social connections     Talks on phone: Not on file     Gets together: Not on file     Attends Caodaism service: Not on file     Active member of club or organization: Not on file     Attends meetings of clubs or organizations: Not on file     Relationship status: Not on file    Intimate partner violence     Fear of current or ex partner: Not on file     Emotionally abused: Not on file     Physically abused: Not on file     Forced sexual activity: Not on file   Other Topics Concern    Not on file   Social History Narrative Lives back in [place] temporarily, 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 Review of Systems :  Review of Systems   Constitutional: Negative for fatigue, malaise, fever, chills, appetite change and unexpected weight change. HENT: Negative for congestion, ear discharge, ear pain, facial swelling, mouth sores, postnasal drip, rhinorrhea, sinus pressure, sore throat, tinnitus and trouble swallowing. Eyes: Negative for vision changes, double vision, pain  Respiratory: Negative for cough, chest tightness, shortness of breath, chest heaviness, pleuritic pain,  wheezing. Cardiovascular: Negative for diaphoresis, chest pain, palpitations, or edema   Gastrointestinal: Negative for nausea, vomiting, abdominal pain, diarrhea, constipation, blood in stool, melena, changes in bowel habits, abdominal distention, anal bleeding and rectal pain. Endocrine: Negative for polydipsia, polyphagia and polyuria. No heat or cold intolerance. Genitourinary: Negative for dysuria, urgency, frequency, hematuria, difficulty urinating. Musculoskeletal: as above, otherwise negative. No back pain. No gait disturbance. Skin: Negative for rash, lesions, hair or nail changes. Allergic/Immunologic: Negative for environmental allergies and food allergies. Neurological: Negative for dizziness, weakness, tremors, syncope, speech difficulty, light-headedness, bowel or bladder control changes, numbness and headaches. Hematological: Negative for adenopathy. Does not bruise/bleed easily. Psychiatric/Behavioral: Negative for suicidal ideas, behavioral problems, sleep disturbance and decreased concentration. The patient is not nervous/anxious. Unless otherwise stated in this report or unable to obtain because of the patient's clinical or mental status as evidenced by the medical record, this patients's positive and negative responses for Review of Systems, constitutional, psych, eyes, ENT, cardiovascular, respiratory, gastrointestinal, neurological, genitourinary, musculoskeletal, integument systems and systems related to the presenting problem are either stated in the preceding or were not pertinent or were negative for the symptoms and/or complaints related to the medical problem. Physical Exam:   Vitals:    02/22/21 1336   BP: 128/84   Pulse: 67   Temp: 97.3 °F (36.3 °C)   SpO2: 98%   Weight: 237 lb (107.5 kg)   Height: 5' 11\" (1.803 m)     Constitutional:  A and O x 3, in NAD. Afebrile. Appears stated age. Head:  NCAT. Eyes:  PERRLA, EOMI without nystagmus. Conjunctiva normal. Sclera anicteric. Ears:  External ears without lesions. TM's clear bilaterally. Throat:  Pharynx without lesions. Airway patient. Mucous membranes pink and moist without lesions. Neck:  Supple. Nontender, no lymphadenopathy noted, no thyromegaly. Lungs:  Clear to auscultation and breath sounds equal.  Heart:  Regular rate and rhythm, normal S1 and S2, no m/r/g.   UE and LE distal pulses intact. Abdomen:  Soft, NTND, NABS x 4, no masses or organomegaly, no rebound or guarding. MS: Normal, painless range of motion. No neurovascular deficit. 5/5 strength in UE's/LE's/ bilaterally. Skin:  No abrasions, ecchymoses, or lacerations unless noted elsewhere. Extremities  No cyanosis, clubbing, or edema noted. Neurological:   Oriented. Motor functions intact. DTRs UE and LE intact. Psych: pleasant, normal affect, appropriate thoughts and insight. Assessment/Plan:     Keiko Ramirez was seen today for diabetes, hyperlipidemia and blood work.     Diagnoses and all orders for this visit:    Mixed hyperlipidemia -     COMPREHENSIVE METABOLIC PANEL; Future  -     CBC; Future  -     LIPID PANEL; Future    Benign prostatic hyperplasia with nocturia  -     PSA SCREENING; Future    Type 2 diabetes mellitus without complication, without long-term current use of insulin (HCC)  -     COMPREHENSIVE METABOLIC PANEL; Future  -     LIPID PANEL; Future  -     HEMOGLOBIN A1C; Future  -     MICROALBUMIN / CREATININE URINE RATIO; Future    Primary osteoarthritis involving multiple joints    Screening PSA (prostate specific antigen)  -     PSA SCREENING; Future        No follow-ups on file. Counseled regarding above diagnosis, including possible risks and complications,  especially if left uncontrolled. Counseled regarding the possible side effects, risks, benefits and alternatives to treatment; patient and/or guardian verbalizes understanding, agrees, feels comfortable with and wishes to proceed with above treatment plan. Advised patient to call with any new medication issues, and read all Rx info from pharmacy to assure aware of all possible risks and side effects of medication before taking. Reviewed age and gender appropriate health screening exams and vaccinations. Advised patient regarding importance of keeping up with recommended health maintenance and to schedule as soon as possible if overdue, as this is important in assessing for undiagnosed pathology, especially cancer, as well as protecting against potentially harmful/life threatening disease. Patient and/or guardian verbalizes understanding and agrees with above counseling, assessment and plan. All questions answered.     Howard Abbott PA-C

## 2021-02-23 DIAGNOSIS — N40.1 BENIGN PROSTATIC HYPERPLASIA WITH NOCTURIA: ICD-10-CM

## 2021-02-23 DIAGNOSIS — R35.1 BENIGN PROSTATIC HYPERPLASIA WITH NOCTURIA: ICD-10-CM

## 2021-02-23 DIAGNOSIS — Z12.5 SCREENING PSA (PROSTATE SPECIFIC ANTIGEN): ICD-10-CM

## 2021-02-23 DIAGNOSIS — E11.9 TYPE 2 DIABETES MELLITUS WITHOUT COMPLICATION, WITHOUT LONG-TERM CURRENT USE OF INSULIN (HCC): ICD-10-CM

## 2021-02-23 DIAGNOSIS — E78.2 MIXED HYPERLIPIDEMIA: ICD-10-CM

## 2021-02-23 LAB
ALBUMIN SERPL-MCNC: 4.9 G/DL (ref 3.5–5.2)
ALP BLD-CCNC: 51 U/L (ref 40–129)
ALT SERPL-CCNC: 21 U/L (ref 0–40)
ANION GAP SERPL CALCULATED.3IONS-SCNC: 10 MMOL/L (ref 7–16)
AST SERPL-CCNC: 27 U/L (ref 0–39)
BILIRUB SERPL-MCNC: 0.5 MG/DL (ref 0–1.2)
BUN BLDV-MCNC: 25 MG/DL (ref 8–23)
CALCIUM SERPL-MCNC: 10 MG/DL (ref 8.6–10.2)
CHLORIDE BLD-SCNC: 102 MMOL/L (ref 98–107)
CHOLESTEROL, TOTAL: 142 MG/DL (ref 0–199)
CO2: 29 MMOL/L (ref 22–29)
CREAT SERPL-MCNC: 1.2 MG/DL (ref 0.7–1.2)
GFR AFRICAN AMERICAN: >60
GFR NON-AFRICAN AMERICAN: >60 ML/MIN/1.73
GLUCOSE BLD-MCNC: 106 MG/DL (ref 74–99)
HBA1C MFR BLD: 6 % (ref 4–5.6)
HCT VFR BLD CALC: 48.8 % (ref 37–54)
HDLC SERPL-MCNC: 35 MG/DL
HEMOGLOBIN: 15.7 G/DL (ref 12.5–16.5)
LDL CHOLESTEROL CALCULATED: 77 MG/DL (ref 0–99)
MCH RBC QN AUTO: 31.3 PG (ref 26–35)
MCHC RBC AUTO-ENTMCNC: 32.2 % (ref 32–34.5)
MCV RBC AUTO: 97.2 FL (ref 80–99.9)
PDW BLD-RTO: 12.4 FL (ref 11.5–15)
PLATELET # BLD: 245 E9/L (ref 130–450)
PMV BLD AUTO: 11.4 FL (ref 7–12)
POTASSIUM SERPL-SCNC: 4.6 MMOL/L (ref 3.5–5)
PROSTATE SPECIFIC ANTIGEN: 2.07 NG/ML (ref 0–4)
RBC # BLD: 5.02 E12/L (ref 3.8–5.8)
SODIUM BLD-SCNC: 141 MMOL/L (ref 132–146)
TOTAL PROTEIN: 7.7 G/DL (ref 6.4–8.3)
TRIGL SERPL-MCNC: 151 MG/DL (ref 0–149)
VLDLC SERPL CALC-MCNC: 30 MG/DL
WBC # BLD: 7 E9/L (ref 4.5–11.5)

## 2021-02-28 ENCOUNTER — IMMUNIZATION (OUTPATIENT)
Dept: PRIMARY CARE CLINIC | Age: 66
End: 2021-02-28
Payer: COMMERCIAL

## 2021-02-28 PROCEDURE — 91300 COVID-19, PFIZER VACCINE 30MCG/0.3ML DOSE: CPT | Performed by: INTERNAL MEDICINE

## 2021-02-28 PROCEDURE — 0001A COVID-19, PFIZER VACCINE 30MCG/0.3ML DOSE: CPT | Performed by: INTERNAL MEDICINE

## 2021-03-25 ENCOUNTER — IMMUNIZATION (OUTPATIENT)
Dept: PRIMARY CARE CLINIC | Age: 66
End: 2021-03-25
Payer: COMMERCIAL

## 2021-03-25 PROCEDURE — 0002A COVID-19, PFIZER VACCINE 30MCG/0.3ML DOSE: CPT | Performed by: NURSE PRACTITIONER

## 2021-03-25 PROCEDURE — 91300 COVID-19, PFIZER VACCINE 30MCG/0.3ML DOSE: CPT | Performed by: NURSE PRACTITIONER

## 2021-04-16 RX ORDER — FENOFIBRATE 160 MG/1
160 TABLET ORAL DAILY
Qty: 90 TABLET | Refills: 1 | Status: SHIPPED
Start: 2021-04-16 | End: 2021-08-20 | Stop reason: SDUPTHER

## 2021-07-01 DIAGNOSIS — E78.2 MIXED HYPERLIPIDEMIA: ICD-10-CM

## 2021-07-01 RX ORDER — ROSUVASTATIN CALCIUM 40 MG/1
TABLET, COATED ORAL
Qty: 90 TABLET | Refills: 1 | Status: SHIPPED
Start: 2021-07-01 | End: 2021-08-20 | Stop reason: SDUPTHER

## 2021-08-20 DIAGNOSIS — E78.2 MIXED HYPERLIPIDEMIA: ICD-10-CM

## 2021-08-20 RX ORDER — ROSUVASTATIN CALCIUM 40 MG/1
40 TABLET, COATED ORAL DAILY
Qty: 90 TABLET | Refills: 1 | Status: SHIPPED
Start: 2021-08-20 | End: 2021-08-24 | Stop reason: SDUPTHER

## 2021-08-20 RX ORDER — FENOFIBRATE 160 MG/1
160 TABLET ORAL DAILY
Qty: 90 TABLET | Refills: 1 | Status: SHIPPED
Start: 2021-08-20 | End: 2021-08-24 | Stop reason: SDUPTHER

## 2021-08-24 DIAGNOSIS — E78.2 MIXED HYPERLIPIDEMIA: ICD-10-CM

## 2021-08-24 RX ORDER — FENOFIBRATE 160 MG/1
160 TABLET ORAL DAILY
Qty: 90 TABLET | Refills: 1 | Status: SHIPPED
Start: 2021-08-24 | End: 2022-01-26

## 2021-08-24 RX ORDER — ROSUVASTATIN CALCIUM 40 MG/1
40 TABLET, COATED ORAL DAILY
Qty: 90 TABLET | Refills: 1 | Status: SHIPPED
Start: 2021-08-24 | End: 2022-01-26

## 2021-12-06 ENCOUNTER — TELEPHONE (OUTPATIENT)
Dept: PRIMARY CARE CLINIC | Age: 66
End: 2021-12-06

## 2021-12-07 ENCOUNTER — PATIENT MESSAGE (OUTPATIENT)
Dept: PRIMARY CARE CLINIC | Age: 66
End: 2021-12-07

## 2021-12-07 DIAGNOSIS — Z00.00 ANNUAL PHYSICAL EXAM: Primary | ICD-10-CM

## 2021-12-07 NOTE — TELEPHONE ENCOUNTER
Patient had to have his physical rescheduled due to [facility] not being released to [facility]. He would like to have the bloodwork done and then reschedule his appointment with [facility]. He wants the labs to be done sooner than before she returns. Would you be willing to place the orders?

## 2021-12-07 NOTE — TELEPHONE ENCOUNTER
From: Christa Garcia  Sent: 12/7/2021 3:11 PM EST  To: Arelis Wallace Clinical Staff  Subject: Scheduling Blood/Lab work    I would like to schedule the bloodwork as soon as possible and then follow up with scheduling my physical after we have the results. I'm like to get the blood work on this year's benefits/claims.

## 2021-12-14 ENCOUNTER — HOSPITAL ENCOUNTER (OUTPATIENT)
Age: 66
Discharge: HOME | End: 2021-12-14
Payer: MEDICARE

## 2021-12-14 DIAGNOSIS — Z00.00 ANNUAL PHYSICAL EXAM: ICD-10-CM

## 2021-12-14 LAB
ALBUMIN SERPL-MCNC: 4.9 G/DL (ref 3.5–5.2)
ALP BLD-CCNC: 46 U/L (ref 40–129)
ALT SERPL-CCNC: 32 U/L (ref 0–40)
ANION GAP SERPL CALCULATED.3IONS-SCNC: 10 MMOL/L (ref 7–16)
AST SERPL-CCNC: 29 U/L (ref 0–39)
BILIRUB SERPL-MCNC: 0.7 MG/DL (ref 0–1.2)
BUN BLDV-MCNC: 22 MG/DL (ref 6–23)
CALCIUM SERPL-MCNC: 10.1 MG/DL (ref 8.6–10.2)
CHLORIDE BLD-SCNC: 103 MMOL/L (ref 98–107)
CHOLESTEROL, TOTAL: 148 MG/DL (ref 0–199)
CO2: 26 MMOL/L (ref 22–29)
CREAT SERPL-MCNC: 1.2 MG/DL (ref 0.7–1.2)
GFR AFRICAN AMERICAN: >60
GFR NON-AFRICAN AMERICAN: >60 ML/MIN/1.73
GLUCOSE BLD-MCNC: 103 MG/DL (ref 74–99)
HCT VFR BLD CALC: 45.1 % (ref 37–54)
HDLC SERPL-MCNC: 34 MG/DL
HEMOGLOBIN: 15.8 G/DL (ref 12.5–16.5)
LDL CHOLESTEROL CALCULATED: 88 MG/DL (ref 0–99)
MCH RBC QN AUTO: 32 PG (ref 26–35)
MCHC RBC AUTO-ENTMCNC: 35 % (ref 32–34.5)
MCV RBC AUTO: 91.3 FL (ref 80–99.9)
PDW BLD-RTO: 12.6 FL (ref 11.5–15)
PLATELET # BLD: 218 E9/L (ref 130–450)
PMV BLD AUTO: 10.4 FL (ref 7–12)
POTASSIUM SERPL-SCNC: 4.3 MMOL/L (ref 3.5–5)
RBC # BLD: 4.94 E12/L (ref 3.8–5.8)
SODIUM BLD-SCNC: 139 MMOL/L (ref 132–146)
TOTAL PROTEIN: 7.6 G/DL (ref 6.4–8.3)
TRIGL SERPL-MCNC: 132 MG/DL (ref 0–149)
VLDLC SERPL CALC-MCNC: 26 MG/DL
WBC # BLD: 7.3 E9/L (ref 4.5–11.5)

## 2021-12-14 PROCEDURE — 85027 COMPLETE CBC AUTOMATED: CPT

## 2021-12-14 PROCEDURE — 80061 LIPID PANEL: CPT

## 2021-12-14 PROCEDURE — 80053 COMPREHEN METABOLIC PANEL: CPT

## 2021-12-14 PROCEDURE — 36415 COLL VENOUS BLD VENIPUNCTURE: CPT

## 2022-01-25 DIAGNOSIS — E78.2 MIXED HYPERLIPIDEMIA: ICD-10-CM

## 2022-01-26 RX ORDER — FENOFIBRATE 160 MG/1
TABLET ORAL
Qty: 90 TABLET | Refills: 1 | Status: SHIPPED
Start: 2022-01-26 | End: 2022-02-25 | Stop reason: SDUPTHER

## 2022-01-26 RX ORDER — ROSUVASTATIN CALCIUM 40 MG/1
TABLET, COATED ORAL
Qty: 90 TABLET | Refills: 1 | Status: SHIPPED
Start: 2022-01-26 | End: 2022-02-25 | Stop reason: SDUPTHER

## 2022-02-25 ENCOUNTER — OFFICE VISIT (OUTPATIENT)
Dept: PRIMARY CARE CLINIC | Age: 67
End: 2022-02-25
Payer: MEDICARE

## 2022-02-25 ENCOUNTER — HOSPITAL ENCOUNTER (OUTPATIENT)
Age: 67
Discharge: HOME | End: 2022-02-25
Payer: MEDICARE

## 2022-02-25 VITALS
SYSTOLIC BLOOD PRESSURE: 110 MMHG | RESPIRATION RATE: 16 BRPM | BODY MASS INDEX: 34.86 KG/M2 | DIASTOLIC BLOOD PRESSURE: 74 MMHG | TEMPERATURE: 97.5 F | HEART RATE: 69 BPM | WEIGHT: 249 LBS | HEIGHT: 71 IN | OXYGEN SATURATION: 97 %

## 2022-02-25 DIAGNOSIS — E11.9 TYPE 2 DIABETES MELLITUS WITHOUT COMPLICATION, WITHOUT LONG-TERM CURRENT USE OF INSULIN (HCC): ICD-10-CM

## 2022-02-25 DIAGNOSIS — Z11.52 ENCOUNTER FOR SCREENING FOR COVID-19: ICD-10-CM

## 2022-02-25 DIAGNOSIS — N40.1 BENIGN PROSTATIC HYPERPLASIA WITH NOCTURIA: ICD-10-CM

## 2022-02-25 DIAGNOSIS — Z00.00 MEDICARE ANNUAL WELLNESS VISIT, INITIAL: Primary | ICD-10-CM

## 2022-02-25 DIAGNOSIS — J30.2 SEASONAL ALLERGIC RHINITIS, UNSPECIFIED TRIGGER: ICD-10-CM

## 2022-02-25 DIAGNOSIS — R35.1 BENIGN PROSTATIC HYPERPLASIA WITH NOCTURIA: ICD-10-CM

## 2022-02-25 DIAGNOSIS — E78.2 MIXED HYPERLIPIDEMIA: ICD-10-CM

## 2022-02-25 DIAGNOSIS — Z00.00 ROUTINE GENERAL MEDICAL EXAMINATION AT A HEALTH CARE FACILITY: ICD-10-CM

## 2022-02-25 PROBLEM — J30.9 ALLERGIC RHINITIS: Status: ACTIVE | Noted: 2022-02-25

## 2022-02-25 LAB — HBA1C MFR BLD: 6.9 %

## 2022-02-25 PROCEDURE — 3017F COLORECTAL CA SCREEN DOC REV: CPT | Performed by: PHYSICIAN ASSISTANT

## 2022-02-25 PROCEDURE — 36415 COLL VENOUS BLD VENIPUNCTURE: CPT

## 2022-02-25 PROCEDURE — 83036 HEMOGLOBIN GLYCOSYLATED A1C: CPT | Performed by: PHYSICIAN ASSISTANT

## 2022-02-25 PROCEDURE — 86769 SARS-COV-2 COVID-19 ANTIBODY: CPT

## 2022-02-25 PROCEDURE — G0438 PPPS, INITIAL VISIT: HCPCS | Performed by: PHYSICIAN ASSISTANT

## 2022-02-25 PROCEDURE — 1123F ACP DISCUSS/DSCN MKR DOCD: CPT | Performed by: PHYSICIAN ASSISTANT

## 2022-02-25 PROCEDURE — G8484 FLU IMMUNIZE NO ADMIN: HCPCS | Performed by: PHYSICIAN ASSISTANT

## 2022-02-25 PROCEDURE — 4040F PNEUMOC VAC/ADMIN/RCVD: CPT | Performed by: PHYSICIAN ASSISTANT

## 2022-02-25 PROCEDURE — 3046F HEMOGLOBIN A1C LEVEL >9.0%: CPT | Performed by: PHYSICIAN ASSISTANT

## 2022-02-25 RX ORDER — FENOFIBRATE 160 MG/1
TABLET ORAL
Qty: 90 TABLET | Refills: 1 | Status: SHIPPED | OUTPATIENT
Start: 2022-02-25 | End: 2022-09-23 | Stop reason: SDUPTHER

## 2022-02-25 RX ORDER — ROSUVASTATIN CALCIUM 40 MG/1
TABLET, COATED ORAL
Qty: 90 TABLET | Refills: 1 | Status: SHIPPED | OUTPATIENT
Start: 2022-02-25 | End: 2022-09-01 | Stop reason: SDUPTHER

## 2022-02-25 SDOH — ECONOMIC STABILITY: FOOD INSECURITY: WITHIN THE PAST 12 MONTHS, YOU WORRIED THAT YOUR FOOD WOULD RUN OUT BEFORE YOU GOT MONEY TO BUY MORE.: NEVER TRUE

## 2022-02-25 SDOH — ECONOMIC STABILITY: FOOD INSECURITY: WITHIN THE PAST 12 MONTHS, THE FOOD YOU BOUGHT JUST DIDN'T LAST AND YOU DIDN'T HAVE MONEY TO GET MORE.: NEVER TRUE

## 2022-02-25 ASSESSMENT — SOCIAL DETERMINANTS OF HEALTH (SDOH): HOW HARD IS IT FOR YOU TO PAY FOR THE VERY BASICS LIKE FOOD, HOUSING, MEDICAL CARE, AND HEATING?: NOT HARD AT ALL

## 2022-02-25 ASSESSMENT — LIFESTYLE VARIABLES
HAVE YOU OR SOMEONE ELSE BEEN INJURED AS A RESULT OF YOUR DRINKING: 0
HOW MANY STANDARD DRINKS CONTAINING ALCOHOL DO YOU HAVE ON A TYPICAL DAY: 3 OR 4
HAS A RELATIVE, FRIEND, DOCTOR, OR ANOTHER HEALTH PROFESSIONAL EXPRESSED CONCERN ABOUT YOUR DRINKING OR SUGGESTED YOU CUT DOWN: 0
HOW OFTEN DURING THE LAST YEAR HAVE YOU FOUND THAT YOU WERE NOT ABLE TO STOP DRINKING ONCE YOU HAD STARTED: 0
HOW OFTEN DURING THE LAST YEAR HAVE YOU BEEN UNABLE TO REMEMBER WHAT HAPPENED THE NIGHT BEFORE BECAUSE YOU HAD BEEN DRINKING: 0
HOW OFTEN DURING THE LAST YEAR HAVE YOU FAILED TO DO WHAT WAS NORMALLY EXPECTED FROM YOU BECAUSE OF DRINKING: 0
HOW OFTEN DURING THE LAST YEAR HAVE YOU NEEDED AN ALCOHOLIC DRINK FIRST THING IN THE MORNING TO GET YOURSELF GOING AFTER A NIGHT OF HEAVY DRINKING: 0
HOW OFTEN DO YOU HAVE A DRINK CONTAINING ALCOHOL: 2-4 TIMES A MONTH
HOW OFTEN DURING THE LAST YEAR HAVE YOU HAD A FEELING OF GUILT OR REMORSE AFTER DRINKING: 0

## 2022-02-25 ASSESSMENT — PATIENT HEALTH QUESTIONNAIRE - PHQ9
SUM OF ALL RESPONSES TO PHQ QUESTIONS 1-9: 0
SUM OF ALL RESPONSES TO PHQ9 QUESTIONS 1 & 2: 0
SUM OF ALL RESPONSES TO PHQ QUESTIONS 1-9: 0
2. FEELING DOWN, DEPRESSED OR HOPELESS: 0
SUM OF ALL RESPONSES TO PHQ QUESTIONS 1-9: 0
SUM OF ALL RESPONSES TO PHQ QUESTIONS 1-9: 0
1. LITTLE INTEREST OR PLEASURE IN DOING THINGS: 0

## 2022-02-25 NOTE — PATIENT INSTRUCTIONS
Personalized Preventive Plan for Sathya Chilel - 2/25/2022  Medicare offers a range of preventive health benefits. Some of the tests and screenings are paid in full while other may be subject to a deductible, co-insurance, and/or copay. Some of these benefits include a comprehensive review of your medical history including lifestyle, illnesses that may run in your family, and various assessments and screenings as appropriate. After reviewing your medical record and screening and assessments performed today your provider may have ordered immunizations, labs, imaging, and/or referrals for you. A list of these orders (if applicable) as well as your Preventive Care list are included within your After Visit Summary for your review. Other Preventive Recommendations:    · A preventive eye exam performed by an eye specialist is recommended every 1-2 years to screen for glaucoma; cataracts, macular degeneration, and other eye disorders. · A preventive dental visit is recommended every 6 months. · Try to get at least 150 minutes of exercise per week or 10,000 steps per day on a pedometer . · Order or download the FREE \"Exercise & Physical Activity: Your Everyday Guide\" from [employer]. Call 1-956.750.7707 or search [employer] online. · You need 6734-1943 mg of calcium and 1064-8012 IU of vitamin D per day. It is possible to meet your calcium requirement with diet alone, but a vitamin D supplement is usually necessary to meet this goal.  · When exposed to the sun, use a sunscreen that protects against both UVA and UVB radiation with an SPF of 30 or greater. Reapply every 2 to 3 hours or after sweating, drying off with a towel, or swimming. · Always wear a seat belt when traveling in a car. Always wear a helmet when riding a bicycle or motorcycle.

## 2022-02-25 NOTE — PROGRESS NOTES
Medicare Annual Wellness Visit    Nat Villalpando is here for Medicare AWV    Javy was seen today for medicare aw. Diagnoses and all orders for this visit:    Medicare annual wellness visit, initial    Mixed hyperlipidemia  -     rosuvastatin (CRESTOR) 40 MG tablet; TAKE 1 TABLET EVERY DAY  -     fenofibrate (TRIGLIDE) 160 MG tablet; TAKE 1 TABLET EVERY DAY    Routine general medical examination at a health care facility    Type 2 diabetes mellitus without complication, without long-term current use of insulin (HCC)  -     POCT glycosylated hemoglobin (Hb A1C)    Benign prostatic hyperplasia with nocturia   -sees urology. Seasonal allergic rhinitis, unspecified trigger   -recommende nasal saline, and flonase. FU if nose bleeds worsen or do not improve. Likely due to dryness, try the saline. Encounter for screening for COVID-19  -     Covid-19, Antibody; Future         Recommendations for Preventive Services Due: see orders and patient instructions/AVS.  Recommended screening schedule for the next 5-10 years is provided to the patient in written form: see Patient Instructions/AVS.     Return in 6 months (on 8/25/2022) for Medicare Annual Wellness Visit in 1 year. Subjective   The following acute and/or chronic problems were also addressed today:  His hyperlipidemia, well controlled on the triglide and crestor. T2DM, has been controlled, with diet, and he continues with diet and exercise (at least 5 days a week). a1c needs drawn. Seeing Dr. Sagrario Chiu this summer, BPH, on flomax. May be considering a urolift. Sees Dr. Sun Hugo for podiatry care, mycotic nails. Patient's complete Health Risk Assessment and screening values have been reviewed and are found in Flowsheets. The following problems were reviewed today and where indicated follow up appointments were made and/or referrals ordered.     Positive Risk Factor Screenings with Interventions:        Alcohol Screening:  AUDIT Total Score: 3    A score of 8 or more is associated with harmful or hazardous drinking. A score of 13 or more in women, and 15 or more in men, is likely to indicate alcohol dependence. Substance Abuse - Alcohol Interventions:  n/a. General Health Risk Interventions:  · n/a. Health Habits/Nutrition:     Physical Activity: Sufficiently Active    Days of Exercise per Week: 5 days    Minutes of Exercise per Session: 60 min     Have you lost any weight without trying in the past 3 months?: No  Body mass index: (!) 34.72  Have you seen the dentist within the past year?: Yes    Health Habits/Nutrition Interventions:  · n/a. Objective   Vitals:    02/25/22 0925   BP: 110/74   Pulse: 69   Resp: 16   Temp: 97.5 °F (36.4 °C)   SpO2: 97%   Weight: 249 lb (112.9 kg)   Height: 5' 11\" (1.803 m)      Body mass index is 34.73 kg/m².         General Appearance: alert and oriented to person, place and time, well developed and well- nourished, in no acute distress  Skin: warm and dry, no rash or erythema  Head: normocephalic and atraumatic  Eyes: pupils equal, round, and reactive to light, extraocular eye movements intact, conjunctivae normal  ENT: tympanic membrane, external ear and ear canal normal bilaterally, nose without deformity, nasal mucosa and turbinates normal without polyps  Neck: supple and non-tender without mass, no thyromegaly or thyroid nodules, no cervical lymphadenopathy  Pulmonary/Chest: clear to auscultation bilaterally- no wheezes, rales or rhonchi, normal air movement, no respiratory distress  Cardiovascular: normal rate, regular rhythm, normal S1 and S2, no murmurs, rubs, clicks, or gallops, distal pulses intact, no carotid bruits  Abdomen: soft, non-tender, non-distended, normal bowel sounds, no masses or organomegaly  Extremities: no cyanosis, clubbing or edema  Musculoskeletal: normal range of motion, no joint swelling, deformity or tenderness  Neurologic: reflexes normal and symmetric, no cranial nerve deficit, gait, coordination and speech normal       No Known Allergies  Prior to Visit Medications    Medication Sig Taking?  Authorizing Provider   rosuvastatin (CRESTOR) 40 MG tablet TAKE 1 TABLET EVERY DAY Yes Yohana Maldonado PA-C   fenofibrate (TRIGLIDE) 160 MG tablet TAKE 1 TABLET EVERY DAY Yes Yohana Maldonado PA-C   tamsulosin (FLOMAX) 0.4 MG capsule TK 1 C PO QHS Yes Historical Provider, MD   sildenafil (REVATIO) 20 MG tablet Take 20 mg by mouth as needed 1-5 tablets by mouth as needed Yes Historical Provider, MD   Omega 3-6-9 Fatty Acids (OMEGA 3-6-9 COMPLEX PO) Take by mouth daily Yes Historical Provider, MD   Red Yeast Rice 600 MG CAPS Take by mouth Yes Historical Provider, MD   vitamin D 1000 units CAPS Take by mouth Yes Historical Provider, MD   Glucosamine-Chondroitin 250-200 MG TABS Take by mouth Yes Historical Provider, MD   APPLE CIDER VINEGAR PO Take by mouth Yes Historical Provider, MD       CareTeam (Including outside providers/suppliers regularly involved in providing care):   Patient Care Team:  Yohana Maldonado PA-C as PCP - General (Physician Assistant)  Yohana Maldonado PA-C as PCP - [facility] Empaneled Provider    Reviewed and updated this visit:  Tobacco  Allergies  Meds  Problems  Med Hx  Surg Hx  Soc Hx  Fam Hx

## 2022-02-26 LAB — SARS-COV-2 ANTIBODY, TOTAL: NORMAL

## 2022-03-10 DIAGNOSIS — L57.0 ACTINIC KERATOSIS: Primary | ICD-10-CM

## 2022-03-10 DIAGNOSIS — L98.9 SKIN LESIONS: ICD-10-CM

## 2022-03-31 ENCOUNTER — TELEPHONE (OUTPATIENT)
Dept: PRIMARY CARE CLINIC | Age: 67
End: 2022-03-31

## 2022-03-31 NOTE — TELEPHONE ENCOUNTER
----- Message from Jb Vo sent at 3/31/2022  4:00 PM EDT -----  Subject: Referral Request    QUESTIONS   Reason for referral request? Patient needs a referral to dermatology. Has the physician seen you for this condition before? No   Preferred Specialist (if applicable)? Do you already have an appointment scheduled? Additional Information for Provider? Pt was told he would get a referral   around 03/10/22-03/15/22. No referral that I see and the pt has not   received a call from Dermatology. ---------------------------------------------------------------------------  --------------  [place] INFO  What is the best way for the office to contact you? OK to leave message on   voicemail  Preferred Call Back Phone Number?  [phone]

## 2022-03-31 NOTE — TELEPHONE ENCOUNTER
A referral has already been placed a while back. He wanted to see Dr. Dulce Gregg. Can we check on this?

## 2022-05-10 ENCOUNTER — HOSPITAL ENCOUNTER (OUTPATIENT)
Age: 67
Discharge: HOME | End: 2022-05-10
Payer: MEDICARE

## 2022-05-10 LAB — PROSTATE SPECIFIC ANTIGEN: 1.98 NG/ML (ref 0–4)

## 2022-05-10 PROCEDURE — G0103 PSA SCREENING: HCPCS

## 2022-05-10 PROCEDURE — 36415 COLL VENOUS BLD VENIPUNCTURE: CPT

## 2022-08-29 SDOH — HEALTH STABILITY: PHYSICAL HEALTH: ON AVERAGE, HOW MANY MINUTES DO YOU ENGAGE IN EXERCISE AT THIS LEVEL?: 70 MIN

## 2022-08-29 SDOH — HEALTH STABILITY: PHYSICAL HEALTH: ON AVERAGE, HOW MANY DAYS PER WEEK DO YOU ENGAGE IN MODERATE TO STRENUOUS EXERCISE (LIKE A BRISK WALK)?: 5 DAYS

## 2022-08-29 ASSESSMENT — SOCIAL DETERMINANTS OF HEALTH (SDOH)
WITHIN THE LAST YEAR, HAVE YOU BEEN KICKED, HIT, SLAPPED, OR OTHERWISE PHYSICALLY HURT BY YOUR PARTNER OR EX-PARTNER?: NO
WITHIN THE LAST YEAR, HAVE YOU BEEN AFRAID OF YOUR PARTNER OR EX-PARTNER?: NO
WITHIN THE LAST YEAR, HAVE YOU BEEN HUMILIATED OR EMOTIONALLY ABUSED IN OTHER WAYS BY YOUR PARTNER OR EX-PARTNER?: NO
WITHIN THE LAST YEAR, HAVE TO BEEN RAPED OR FORCED TO HAVE ANY KIND OF SEXUAL ACTIVITY BY YOUR PARTNER OR EX-PARTNER?: NO

## 2022-09-01 ENCOUNTER — OFFICE VISIT (OUTPATIENT)
Dept: FAMILY MEDICINE CLINIC | Age: 67
End: 2022-09-01
Payer: MEDICARE

## 2022-09-01 VITALS
HEIGHT: 71 IN | HEART RATE: 77 BPM | WEIGHT: 239 LBS | BODY MASS INDEX: 33.46 KG/M2 | OXYGEN SATURATION: 90 % | DIASTOLIC BLOOD PRESSURE: 74 MMHG | SYSTOLIC BLOOD PRESSURE: 116 MMHG | TEMPERATURE: 98.4 F

## 2022-09-01 DIAGNOSIS — E78.2 MIXED HYPERLIPIDEMIA: ICD-10-CM

## 2022-09-01 DIAGNOSIS — E11.9 TYPE 2 DIABETES MELLITUS WITHOUT COMPLICATION, WITHOUT LONG-TERM CURRENT USE OF INSULIN (HCC): Primary | ICD-10-CM

## 2022-09-01 DIAGNOSIS — R35.1 BENIGN PROSTATIC HYPERPLASIA WITH NOCTURIA: ICD-10-CM

## 2022-09-01 DIAGNOSIS — L57.0 ACTINIC KERATOSIS: ICD-10-CM

## 2022-09-01 DIAGNOSIS — M15.9 PRIMARY OSTEOARTHRITIS INVOLVING MULTIPLE JOINTS: ICD-10-CM

## 2022-09-01 DIAGNOSIS — N40.1 BENIGN PROSTATIC HYPERPLASIA WITH NOCTURIA: ICD-10-CM

## 2022-09-01 LAB — HBA1C MFR BLD: 7 %

## 2022-09-01 PROCEDURE — 99203 OFFICE O/P NEW LOW 30 MIN: CPT | Performed by: FAMILY MEDICINE

## 2022-09-01 PROCEDURE — 1123F ACP DISCUSS/DSCN MKR DOCD: CPT | Performed by: FAMILY MEDICINE

## 2022-09-01 PROCEDURE — 3051F HG A1C>EQUAL 7.0%<8.0%: CPT | Performed by: FAMILY MEDICINE

## 2022-09-01 RX ORDER — ROSUVASTATIN CALCIUM 40 MG/1
TABLET, COATED ORAL
Qty: 90 TABLET | Refills: 1 | Status: SHIPPED | OUTPATIENT
Start: 2022-09-01

## 2022-09-01 RX ORDER — SILDENAFIL 25 MG/1
20 TABLET, FILM COATED ORAL PRN
COMMUNITY

## 2022-09-01 NOTE — PROGRESS NOTES
Barber Helm \"Mindi" is a 79 y.o. male who presents today for   Chief Complaint   Patient presents with    Diabetes    Established New Doctor       HPI: 70yo male here to f/u for DM / PreDM. He reports previously being borderline with his A1c's. He has had good dietary adherence to a reduced carbohydrate diet and he is trying to moderate his weight as well he watches portion sizes and reduces his sugary drink intake is much as possible. Reports good exertional capacity. Reports having COVID 3 weeks ago. He feels improved from that no chronic side effects    Right Shoulder Pain, Right knee pain -has had some right shoulder pain for years but does not want to pursue any further work-up or treatment of it he is able to do his desired physical activities without any limitation. It does not seem to bother him much. Right knee pain again chronic is not flared currently no pain currently        Past Medical History:   Diagnosis Date    BPH (benign prostatic hyperplasia)     History of pneumonia     Hyperlipidemia        Current Outpatient Medications   Medication Instructions    APPLE CIDER VINEGAR PO Oral    fenofibrate (TRIGLIDE) 160 MG tablet TAKE 1 TABLET EVERY DAY    Glucosamine-Chondroitin 250-200 MG TABS Oral    Omega 3-6-9 Fatty Acids (OMEGA 3-6-9 COMPLEX PO) Oral, DAILY    Red Yeast Rice 600 MG CAPS Oral    rosuvastatin (CRESTOR) 40 MG tablet TAKE 1 TABLET EVERY DAY    sildenafil (REVATIO) 20 mg, Oral, PRN, 1-5 tablets by mouth as needed     sildenafil (VIAGRA) 20 mg, Oral, PRN    tamsulosin (FLOMAX) 0.4 MG capsule TK 1 C PO QHS    vitamin D 1000 units CAPS Oral       No Known Allergies    Family History   Problem Relation Age of Onset    Alzheimer's Disease Mother     Other Father         unknown. Alzheimer's Disease Maternal Grandmother     Cancer Maternal Grandfather         pancreatic    Other Other         maternal uncle, alzheimer's. Other Brother         brain tumor, seizures.         Past Surgical History:   Procedure Laterality Date    STRABISMUS SURGERY         Social History     Tobacco Use    Smoking status: Never    Smokeless tobacco: Never    Tobacco comments:     as a teen, never consistent   Substance Use Topics    Alcohol use: Yes     Comment: occasionally, socially     Drug use: No       ROS:     A Extended Review of symptoms obtained during Physical exam was otherwise unremarkable. Physical Exam    Gen: NAD, AAOX3  HEENT: NC/AT,  PERRL / ANICTERIC, No Facial Droop. OP Clear, No obvious thyromegaly. CV: NDPMI, RRR no MRG  RESP: no IWOB, ctab. no w/r. ABD: +BS, SOFT, NT, ND. No rebound / guarding. EXT: AGUIRRE Equally grossly. No LE Edema. 2+ Radial and DP Pulses. SKIN: Warm / Dry. Pink patches over forearms bilaterally  Mood/ Affect: Pleasant & cooperative. A/P:    Actinic keratosis   Cont to f/u with Dr Patricia Hendricks. Primary osteoarthritis involving multiple joints   Cont to monitor right knee and right shoulder. .  If worsening, will let me know. Type 2 diabetes mellitus without complication, without long-term current use of insulin (HCC)   A1c 7.0 today discussed medication management versus continued dietary management at length patient would continue parenterally dietary management. We will consider following up in about 6 months for this and if he does not want to try metformin he could let me know    Hyperlipidemia  He takes fenofibrate Crestor as well as red yeast rice. Does not sound like his triglycerides were in the extremely high range however he was started on this by the previous physician and is tolerated very well and he like to continue it for the time being he also does use fish oil and eats fish as part of his diet.   For now we will continue everything the same we did discuss the combination of his medications could be hepatotoxic however I did not see any that evidence on his lab work         Recommended shingles vaccine    Health risk factors

## 2022-09-01 NOTE — ASSESSMENT & PLAN NOTE
He takes fenofibrate Crestor as well as red yeast rice. Does not sound like his triglycerides were in the extremely high range however he was started on this by the previous physician and is tolerated very well and he like to continue it for the time being he also does use fish oil and eats fish as part of his diet.   For now we will continue everything the same we did discuss the combination of his medications could be hepatotoxic however I did not see any that evidence on his lab work

## 2022-09-01 NOTE — ASSESSMENT & PLAN NOTE
A1c 7.0 today discussed medication management versus continued dietary management at length patient would continue parenterally dietary management.   We will consider following up in about 6 months for this and if he does not want to try metformin he could let me know

## 2022-09-23 DIAGNOSIS — E78.2 MIXED HYPERLIPIDEMIA: ICD-10-CM

## 2022-09-23 RX ORDER — FENOFIBRATE 160 MG/1
TABLET ORAL
Qty: 90 TABLET | Refills: 1 | Status: SHIPPED | OUTPATIENT
Start: 2022-09-23

## 2023-03-28 SDOH — ECONOMIC STABILITY: TRANSPORTATION INSECURITY
IN THE PAST 12 MONTHS, HAS LACK OF TRANSPORTATION KEPT YOU FROM MEETINGS, WORK, OR FROM GETTING THINGS NEEDED FOR DAILY LIVING?: NO

## 2023-03-28 SDOH — ECONOMIC STABILITY: HOUSING INSECURITY
IN THE LAST 12 MONTHS, WAS THERE A TIME WHEN YOU DID NOT HAVE A STEADY PLACE TO SLEEP OR SLEPT IN A SHELTER (INCLUDING NOW)?: NO

## 2023-03-28 SDOH — ECONOMIC STABILITY: INCOME INSECURITY: HOW HARD IS IT FOR YOU TO PAY FOR THE VERY BASICS LIKE FOOD, HOUSING, MEDICAL CARE, AND HEATING?: NOT HARD AT ALL

## 2023-03-28 SDOH — ECONOMIC STABILITY: FOOD INSECURITY: WITHIN THE PAST 12 MONTHS, THE FOOD YOU BOUGHT JUST DIDN'T LAST AND YOU DIDN'T HAVE MONEY TO GET MORE.: NEVER TRUE

## 2023-03-28 SDOH — ECONOMIC STABILITY: FOOD INSECURITY: WITHIN THE PAST 12 MONTHS, YOU WORRIED THAT YOUR FOOD WOULD RUN OUT BEFORE YOU GOT MONEY TO BUY MORE.: NEVER TRUE

## 2023-03-30 ENCOUNTER — OFFICE VISIT (OUTPATIENT)
Dept: FAMILY MEDICINE CLINIC | Age: 68
End: 2023-03-30
Payer: MEDICARE

## 2023-03-30 VITALS
DIASTOLIC BLOOD PRESSURE: 81 MMHG | RESPIRATION RATE: 16 BRPM | SYSTOLIC BLOOD PRESSURE: 135 MMHG | OXYGEN SATURATION: 95 % | HEART RATE: 68 BPM | TEMPERATURE: 98.1 F

## 2023-03-30 DIAGNOSIS — E78.2 MIXED HYPERLIPIDEMIA: ICD-10-CM

## 2023-03-30 DIAGNOSIS — Z23 NEED FOR PROPHYLACTIC VACCINATION AGAINST STREPTOCOCCUS PNEUMONIAE (PNEUMOCOCCUS): ICD-10-CM

## 2023-03-30 DIAGNOSIS — R35.1 BENIGN PROSTATIC HYPERPLASIA WITH NOCTURIA: ICD-10-CM

## 2023-03-30 DIAGNOSIS — Z11.59 NEED FOR HEPATITIS C SCREENING TEST: ICD-10-CM

## 2023-03-30 DIAGNOSIS — N40.1 BENIGN PROSTATIC HYPERPLASIA WITH NOCTURIA: ICD-10-CM

## 2023-03-30 DIAGNOSIS — Z23 NEED FOR PROPHYLACTIC VACCINATION AND INOCULATION AGAINST VARICELLA: ICD-10-CM

## 2023-03-30 DIAGNOSIS — N52.8 OTHER MALE ERECTILE DYSFUNCTION: ICD-10-CM

## 2023-03-30 DIAGNOSIS — E11.9 TYPE 2 DIABETES MELLITUS WITHOUT COMPLICATION, WITHOUT LONG-TERM CURRENT USE OF INSULIN (HCC): Primary | ICD-10-CM

## 2023-03-30 LAB
ALBUMIN SERPL-MCNC: 4.9 G/DL (ref 3.5–5.2)
ALP SERPL-CCNC: 56 U/L (ref 40–129)
ALT SERPL-CCNC: 33 U/L (ref 0–40)
ANION GAP SERPL CALCULATED.3IONS-SCNC: 12 MMOL/L (ref 7–16)
AST SERPL-CCNC: 32 U/L (ref 0–39)
BILIRUB SERPL-MCNC: 0.7 MG/DL (ref 0–1.2)
BUN SERPL-MCNC: 18 MG/DL (ref 6–23)
CALCIUM SERPL-MCNC: 9.6 MG/DL (ref 8.6–10.2)
CHLORIDE SERPL-SCNC: 104 MMOL/L (ref 98–107)
CHOLESTEROL, TOTAL: 149 MG/DL (ref 0–199)
CO2 SERPL-SCNC: 21 MMOL/L (ref 22–29)
CREAT SERPL-MCNC: 1 MG/DL (ref 0.7–1.2)
CREAT UR-MCNC: 205 MG/DL (ref 40–278)
GLUCOSE SERPL-MCNC: 143 MG/DL (ref 74–99)
HBA1C MFR BLD: 7 % (ref 4–5.6)
HDLC SERPL-MCNC: 33 MG/DL
LDLC SERPL CALC-MCNC: 75 MG/DL (ref 0–99)
MICROALBUMIN UR-MCNC: 14.4 MG/L
MICROALBUMIN/CREAT UR-RTO: 7 (ref 0–30)
POTASSIUM SERPL-SCNC: 4.5 MMOL/L (ref 3.5–5)
PROT SERPL-MCNC: 8 G/DL (ref 6.4–8.3)
SODIUM SERPL-SCNC: 137 MMOL/L (ref 132–146)
TRIGL SERPL-MCNC: 204 MG/DL (ref 0–149)
VLDLC SERPL CALC-MCNC: 41 MG/DL

## 2023-03-30 PROCEDURE — G8428 CUR MEDS NOT DOCUMENT: HCPCS | Performed by: FAMILY MEDICINE

## 2023-03-30 PROCEDURE — G0009 ADMIN PNEUMOCOCCAL VACCINE: HCPCS | Performed by: FAMILY MEDICINE

## 2023-03-30 PROCEDURE — 1036F TOBACCO NON-USER: CPT | Performed by: FAMILY MEDICINE

## 2023-03-30 PROCEDURE — 36415 COLL VENOUS BLD VENIPUNCTURE: CPT | Performed by: FAMILY MEDICINE

## 2023-03-30 PROCEDURE — 3051F HG A1C>EQUAL 7.0%<8.0%: CPT | Performed by: FAMILY MEDICINE

## 2023-03-30 PROCEDURE — 99214 OFFICE O/P EST MOD 30 MIN: CPT | Performed by: FAMILY MEDICINE

## 2023-03-30 PROCEDURE — 1123F ACP DISCUSS/DSCN MKR DOCD: CPT | Performed by: FAMILY MEDICINE

## 2023-03-30 PROCEDURE — 3017F COLORECTAL CA SCREEN DOC REV: CPT | Performed by: FAMILY MEDICINE

## 2023-03-30 PROCEDURE — G8417 CALC BMI ABV UP PARAM F/U: HCPCS | Performed by: FAMILY MEDICINE

## 2023-03-30 PROCEDURE — G8484 FLU IMMUNIZE NO ADMIN: HCPCS | Performed by: FAMILY MEDICINE

## 2023-03-30 PROCEDURE — 2022F DILAT RTA XM EVC RTNOPTHY: CPT | Performed by: FAMILY MEDICINE

## 2023-03-30 PROCEDURE — 90677 PCV20 VACCINE IM: CPT | Performed by: FAMILY MEDICINE

## 2023-03-30 RX ORDER — FENOFIBRATE 160 MG/1
TABLET ORAL
Qty: 90 TABLET | Refills: 1 | Status: SHIPPED | OUTPATIENT
Start: 2023-03-30

## 2023-03-30 SDOH — ECONOMIC STABILITY: INCOME INSECURITY: HOW HARD IS IT FOR YOU TO PAY FOR THE VERY BASICS LIKE FOOD, HOUSING, MEDICAL CARE, AND HEATING?: NOT HARD AT ALL

## 2023-03-30 SDOH — ECONOMIC STABILITY: FOOD INSECURITY: WITHIN THE PAST 12 MONTHS, YOU WORRIED THAT YOUR FOOD WOULD RUN OUT BEFORE YOU GOT MONEY TO BUY MORE.: NEVER TRUE

## 2023-03-30 SDOH — ECONOMIC STABILITY: FOOD INSECURITY: WITHIN THE PAST 12 MONTHS, THE FOOD YOU BOUGHT JUST DIDN'T LAST AND YOU DIDN'T HAVE MONEY TO GET MORE.: NEVER TRUE

## 2023-03-30 ASSESSMENT — PATIENT HEALTH QUESTIONNAIRE - PHQ9
SUM OF ALL RESPONSES TO PHQ QUESTIONS 1-9: 0
1. LITTLE INTEREST OR PLEASURE IN DOING THINGS: 0
SUM OF ALL RESPONSES TO PHQ QUESTIONS 1-9: 0
2. FEELING DOWN, DEPRESSED OR HOPELESS: 0
SUM OF ALL RESPONSES TO PHQ QUESTIONS 1-9: 0
SUM OF ALL RESPONSES TO PHQ QUESTIONS 1-9: 0
SUM OF ALL RESPONSES TO PHQ9 QUESTIONS 1 & 2: 0

## 2023-03-30 NOTE — PROGRESS NOTES
Tobacco comments:     as a teen, never consistent   Substance Use Topics    Alcohol use: Yes     Comment: occasionally, socially     Drug use: No       ROS:     A Extended Review of symptoms obtained during Physical exam was otherwise unremarkable. Physical Exam    Gen: NAD, AAOX3  HEENT: NC/AT,  PERRL / ANICTERIC, No Facial Droop. OP Clear  CV: NDPMI, RRR no MRG  RESP: no IWOB, ctab. no w/r. ABD: +BS, SOFT, NT, ND. No rebound / guarding. EXT: AGUIRRE Equally grossly. No LE Edema. 2+ Radial Pulses. SKIN: Warm / Dry. Mood/ Affect: Pleasant & cooperative. A/P:   Diagnosis Orders   1. Type 2 diabetes mellitus without complication, without long-term current use of insulin (HCC)  Microalbumin / creatinine urine ratio    Lipid Panel    Comprehensive Metabolic Panel    Hemoglobin A1C    Hemoglobin A1C    Comprehensive Metabolic Panel    Lipid Panel    Microalbumin / creatinine urine ratio      2. Need for prophylactic vaccination against Streptococcus pneumoniae (pneumococcus)  Pneumococcal Conjugate PCV20, PF (Prevnar 20)      3. Need for hepatitis C screening test  Hepatitis C Antibody    Hepatitis C Antibody      4. Need for prophylactic vaccination and inoculation against varicella  zoster recombinant adjuvanted vaccine (SHINGRIX) 50 MCG/0.5ML SUSR injection      5. Benign prostatic hyperplasia with nocturia        6. Other male erectile dysfunction        7.  Mixed hyperlipidemia  fenofibrate (TRIGLIDE) 160 MG tablet          #1 diabetes well controlled on diet patient interested in further weight loss recommend low carbohydrate intake maximize protein intake    #2 BPH recommend continue Flomax follow-up with urology to discuss inadequate symptom control defer PSA measurement to them    #3 mixed hyperlipidemia recommend fenofibrate as well as Crestor would recommend stopping the red yeast rice to reduce pill burden    #4 health maintenance PCV 20 vaccine will need to follow-up for his diabetic retinal

## 2023-03-31 LAB — HCV AB SERPL QL IA: NORMAL

## 2023-06-26 SDOH — HEALTH STABILITY: PHYSICAL HEALTH: ON AVERAGE, HOW MANY DAYS PER WEEK DO YOU ENGAGE IN MODERATE TO STRENUOUS EXERCISE (LIKE A BRISK WALK)?: 6 DAYS

## 2023-06-26 SDOH — HEALTH STABILITY: PHYSICAL HEALTH: ON AVERAGE, HOW MANY MINUTES DO YOU ENGAGE IN EXERCISE AT THIS LEVEL?: 90 MIN

## 2023-06-26 ASSESSMENT — PATIENT HEALTH QUESTIONNAIRE - PHQ9
SUM OF ALL RESPONSES TO PHQ QUESTIONS 1-9: 2
1. LITTLE INTEREST OR PLEASURE IN DOING THINGS: 1
SUM OF ALL RESPONSES TO PHQ QUESTIONS 1-9: 2
2. FEELING DOWN, DEPRESSED OR HOPELESS: 1
SUM OF ALL RESPONSES TO PHQ9 QUESTIONS 1 & 2: 2

## 2023-06-26 ASSESSMENT — LIFESTYLE VARIABLES
HOW MANY STANDARD DRINKS CONTAINING ALCOHOL DO YOU HAVE ON A TYPICAL DAY: 1
HOW OFTEN DO YOU HAVE A DRINK CONTAINING ALCOHOL: 2-4 TIMES A MONTH
HOW MANY STANDARD DRINKS CONTAINING ALCOHOL DO YOU HAVE ON A TYPICAL DAY: 1 OR 2
HOW OFTEN DO YOU HAVE A DRINK CONTAINING ALCOHOL: 3
HOW OFTEN DO YOU HAVE SIX OR MORE DRINKS ON ONE OCCASION: 1

## 2023-06-27 ENCOUNTER — OFFICE VISIT (OUTPATIENT)
Dept: FAMILY MEDICINE CLINIC | Age: 68
End: 2023-06-27
Payer: MEDICARE

## 2023-06-27 VITALS
SYSTOLIC BLOOD PRESSURE: 120 MMHG | DIASTOLIC BLOOD PRESSURE: 74 MMHG | HEIGHT: 72 IN | TEMPERATURE: 98.4 F | HEART RATE: 66 BPM | RESPIRATION RATE: 16 BRPM | BODY MASS INDEX: 33.05 KG/M2 | OXYGEN SATURATION: 97 % | WEIGHT: 244 LBS

## 2023-06-27 DIAGNOSIS — E78.2 MIXED HYPERLIPIDEMIA: ICD-10-CM

## 2023-06-27 DIAGNOSIS — Z00.00 MEDICARE ANNUAL WELLNESS VISIT, SUBSEQUENT: Primary | ICD-10-CM

## 2023-06-27 DIAGNOSIS — Z12.11 SCREENING FOR COLON CANCER: ICD-10-CM

## 2023-06-27 PROCEDURE — 3017F COLORECTAL CA SCREEN DOC REV: CPT | Performed by: FAMILY MEDICINE

## 2023-06-27 PROCEDURE — 1123F ACP DISCUSS/DSCN MKR DOCD: CPT | Performed by: FAMILY MEDICINE

## 2023-06-27 PROCEDURE — G0439 PPPS, SUBSEQ VISIT: HCPCS | Performed by: FAMILY MEDICINE

## 2023-06-27 RX ORDER — FENOFIBRATE 160 MG/1
TABLET ORAL
Qty: 90 TABLET | Refills: 3 | Status: SHIPPED | OUTPATIENT
Start: 2023-06-27

## 2023-06-27 RX ORDER — TADALAFIL 5 MG/1
1 TABLET ORAL DAILY
COMMUNITY
Start: 2023-04-18

## 2023-06-27 RX ORDER — ROSUVASTATIN CALCIUM 40 MG/1
TABLET, COATED ORAL
Qty: 90 TABLET | Refills: 3 | Status: SHIPPED | OUTPATIENT
Start: 2023-06-27

## 2023-06-27 ASSESSMENT — LIFESTYLE VARIABLES: HOW MANY STANDARD DRINKS CONTAINING ALCOHOL DO YOU HAVE ON A TYPICAL DAY: PATIENT DOES NOT DRINK

## 2023-07-05 ENCOUNTER — TELEPHONE (OUTPATIENT)
Dept: SURGERY | Age: 68
End: 2023-07-05

## 2023-07-05 NOTE — TELEPHONE ENCOUNTER
Third attempt, left messages three times. Unable to contact patient. We would be more than happy to schedule this patient with one of our Providers when they call us back. At this time we are forwarding the referral back to referring provider to inform you that we were unable to schedule the patient. Thanks for the referral.  Any questions or concerns call [phone].     Electronically signed by Danni James MA on 7/5/2023 at 10:29 AM

## 2023-08-31 ENCOUNTER — OFFICE VISIT (OUTPATIENT)
Dept: SURGERY | Age: 68
End: 2023-08-31
Payer: MEDICARE

## 2023-08-31 ENCOUNTER — TELEPHONE (OUTPATIENT)
Dept: SURGERY | Age: 68
End: 2023-08-31

## 2023-08-31 ENCOUNTER — PREP FOR PROCEDURE (OUTPATIENT)
Dept: SURGERY | Age: 68
End: 2023-08-31

## 2023-08-31 VITALS
BODY MASS INDEX: 34.16 KG/M2 | RESPIRATION RATE: 16 BRPM | OXYGEN SATURATION: 95 % | WEIGHT: 244 LBS | HEIGHT: 71 IN | SYSTOLIC BLOOD PRESSURE: 125 MMHG | HEART RATE: 62 BPM | DIASTOLIC BLOOD PRESSURE: 78 MMHG

## 2023-08-31 DIAGNOSIS — Z12.11 ENCOUNTER FOR SCREENING COLONOSCOPY: Primary | ICD-10-CM

## 2023-08-31 DIAGNOSIS — Z12.11 SCREEN FOR COLON CANCER: ICD-10-CM

## 2023-08-31 DIAGNOSIS — K42.9 UMBILICAL HERNIA WITHOUT OBSTRUCTION AND WITHOUT GANGRENE: ICD-10-CM

## 2023-08-31 PROCEDURE — 99202 OFFICE O/P NEW SF 15 MIN: CPT | Performed by: SURGERY

## 2023-08-31 PROCEDURE — 99024 POSTOP FOLLOW-UP VISIT: CPT | Performed by: SURGERY

## 2023-08-31 RX ORDER — SODIUM PICOSULFATE, MAGNESIUM OXIDE, AND ANHYDROUS CITRIC ACID 10; 3.5; 12 MG/160ML; G/160ML; G/160ML
1 LIQUID ORAL 2 TIMES DAILY
Qty: 1 EACH | Refills: 0 | Status: SHIPPED | OUTPATIENT
Start: 2023-08-31 | End: 2023-09-01

## 2023-08-31 NOTE — TELEPHONE ENCOUNTER
Scheduled pt for Colonoscopy 23 at 12PM. Pt needs to arrive at [address] at [facility]. Patient confirmed date and time, address and directions given in office. Prep instructions given in office, patient understood. Prior Authorization Form:      DEMOGRAPHICS:                     Patient Name:  Shilpi Diaz  Patient :  1955            Insurance:  Payor: Nirmala Wade / Plan: Mardelle Dollar / Product Type: *No Product type* /   Insurance ID Number:    Payer/Plan Subscr  Sex Relation Sub. Ins. ID Effective Group Num   1.  Les Mass MEDICAShilpi Diaz 1955 Male Self [id number] 1/1/15 [id number]                                   [address] [facility]         DIAGNOSIS & PROCEDURE:                       Procedure/Operation: COLONOSCOPY           CPT Code: 31356    Diagnosis:  SCREENING FOR COLON CANCER    ICD10 Code: Z12.11    Location:  [address] [place] [address]    Surgeon:  Patricia Fitch    SCHEDULING INFORMATION:                          Date: 23    Time: 12PM              Anesthesia:  LMAC                                                       Status:  OUTPATIENT       Special Comments:  N/A       Electronically signed by Carlos Leung MA on 2023 at 9:46 AM

## 2023-08-31 NOTE — PATIENT INSTRUCTIONS
If you have any questions, please call Jackie at [facility]    It is very important that you follow all of the instructions listed on this sheet carefully (they may be slightly different than the directions on the product that you purchase at the pharmacy) to ensure that your colon is adequately cleaned out or your risk of complications could be increased. 2 Days or More Before Endoscopy:  Annmarie Nowak from the pharmacy. Do not eat corn, tomatoes, peas or watermelon 3 to 5 days before procedure. Start clear liquid diet 2 days before the procedure    1 Day Before the Endoscopy:  No solid food - only clear liquids (soup, jello, or juice that you can see through with no solid food) for breakfast, lunch and supper. DO NOT drink or eat anything that is red as it will turn the inside of the colon red and look like blood. Have at least 8 oz or more of clear liquids for breakfast (7 am to 8 am) and lunch (11:30 am to 12:30 pm). Between 5-9 pm, drink 1 bottle of clenpiq  Over the next 5 hours, drink 40 oz (5 cups of clear liquids)  Can continue to take  clear liquids that night    Day of Endoscopy:  4 hours prior to scheduled time for colonoscopy, drink the second bottle of CLENPIQ followed immediately by at least 24 oz of clear liquids. STOP ALL CLEAR LIQUIDS 2 HOURS PRIOR TO SCHEDULED TIME  If any blood pressure medications or heart medications are due in the morning, you should take them with a sip of water. Hold AM oral diabetic medications.   Take 1/2 the insulin dose that morning

## 2023-08-31 NOTE — PROGRESS NOTES
[facility]  HISTORY & PHYSICAL      CC:  Colorectal cancer screening         HPI:     Man Mattson is here as New patient(8/31/2023). The patient was sent here to discuss colorectal cancer screening. The patient denies any weight loss, rectal bleeding, abdominal pain, or change in bowel habits. There is no family history of IBD or colorectal cancer. The patient  had a screening colonoscopy over 10 years ago in [place] that was normal.    He grew up in [place] and lived in [place] and moved back here in 2018. Patient retired and used to work in information technology    Past Medical History:   Diagnosis Date    BPH (benign prostatic hyperplasia)     History of pneumonia     Hyperlipidemia      Past Surgical History:   Procedure Laterality Date    STRABISMUS SURGERY       Social History     Socioeconomic History    Marital status: Single     Spouse name: Not on file    Number of children: Not on file    Years of education: Not on file    Highest education level: Not on file   Occupational History    Not on file   Tobacco Use    Smoking status: Never    Smokeless tobacco: Never    Tobacco comments:     as a teen, never consistent   Substance and Sexual Activity    Alcohol use: Yes     Comment: occasionally, socially     Drug use: No    Sexual activity: Not Currently   Other Topics Concern    Not on file   Social History Narrative    Lives back in [place] temporarily, 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 Social Determinants of Health     Financial Resource Strain: Low Risk     Difficulty of Paying Living Expenses: Not hard at all   Food Insecurity: No Food Insecurity    Worried About [place] in the Last Year: Never true    [address] in the Last Year: Never true   Transportation Needs: Unknown    Lack of Transportation (Medical): Not on file    Lack of Transportation (Non-Medical):  No   Physical Activity: Sufficiently Active    Days of Exercise per Week: 6 days

## 2023-09-30 PROBLEM — Z12.11 SCREEN FOR COLON CANCER: Status: RESOLVED | Noted: 2023-08-31 | Resolved: 2023-09-30

## 2023-10-31 ENCOUNTER — TELEPHONE (OUTPATIENT)
Dept: SURGERY | Age: 68
End: 2023-10-31

## 2023-10-31 RX ORDER — CHLORAL HYDRATE 500 MG
1 CAPSULE ORAL NIGHTLY
COMMUNITY

## 2023-10-31 RX ORDER — AMPICILLIN TRIHYDRATE 250 MG
1 CAPSULE ORAL NIGHTLY
COMMUNITY

## 2023-10-31 NOTE — PROGRESS NOTES
[address]   PRE-ADMISSION TESTING GENERAL INSTRUCTIONS  [facility] Phone Number: [phone]      GENERAL INSTRUCTIONS:    [x] Antibacterial Soap Shower Night before and/or AM of surgery. [x]Follow bowel prep instructions per surgeon. No liquids/jello that are red or purple color. [x] Do not wear makeup, lotions, powders, deodorant. [x] Nothing by mouth after midnight; including gum, candy, mints, or water. [x] You may brush your teeth, gargle, but do NOT swallow water. [x] No tobacco products, illegal drugs, marijuana or alcohol within 24 hours of your surgery. [x] Jewelry or valuables should not be brought to the hospital. All body and/or tongue piercing's must be removed prior to arriving to hospital. No contact lens or hair pins. [x] Arrange transportation with a responsible adult  to and from the hospital. Arrange for someone to be with you for the remainder of the day and for 24 hours after your procedure due to having had anesthesia. -Who will be your  for transportation? Preckelly Changor, brother        -Who will be staying with you for 24 hrs after your procedure? Preckelly Downingor, brother  [x] Itaro card and photo ID. PARKING INSTRUCTIONS:     [x] ARRIVAL DATE  11/6 & TIME   10:45 am:   [x]Surgery time may change, if it does we will call you the business day before your scheduled surgery. [x] Enter into the [employer]. Two adults may accompany you. [x] Parking lot '\"I\"  is located on [facility] (the corner of [address] [place] [address]). To enter the lot,you will need to get a parking ticket at the gate . To exit you will be given a free parking voucher. You will need both the ticket and parking voucher to exit the parking lot. One car per patient is allowed to park in this lot. Walk up the front walk to the [facility], the door will be locked an employee will greet you and let you in.       Patient is planning to

## 2023-11-01 RX ORDER — SODIUM PICOSULFATE, MAGNESIUM OXIDE, AND ANHYDROUS CITRIC ACID 10; 3.5; 12 MG/160ML; G/160ML; G/160ML
1 LIQUID ORAL 2 TIMES DAILY
Qty: 1 EACH | Refills: 0 | Status: SHIPPED | OUTPATIENT
Start: 2023-11-01 | End: 2023-11-02

## 2023-11-02 PROBLEM — Z12.11 SCREEN FOR COLON CANCER: Status: ACTIVE | Noted: 2023-08-31

## 2023-11-05 ENCOUNTER — ANESTHESIA EVENT (OUTPATIENT)
Dept: ENDOSCOPY | Age: 68
End: 2023-11-05
Payer: MEDICARE

## 2023-11-06 ENCOUNTER — ANESTHESIA (OUTPATIENT)
Dept: ENDOSCOPY | Age: 68
End: 2023-11-06
Payer: MEDICARE

## 2023-11-06 ENCOUNTER — HOSPITAL ENCOUNTER (OUTPATIENT)
Age: 68
Setting detail: OUTPATIENT SURGERY
Discharge: HOME | End: 2023-11-06
Attending: SURGERY | Admitting: SURGERY
Payer: MEDICARE

## 2023-11-06 VITALS
HEART RATE: 70 BPM | TEMPERATURE: 97.3 F | OXYGEN SATURATION: 94 % | BODY MASS INDEX: 33.6 KG/M2 | WEIGHT: 240 LBS | DIASTOLIC BLOOD PRESSURE: 85 MMHG | HEIGHT: 71 IN | SYSTOLIC BLOOD PRESSURE: 135 MMHG | RESPIRATION RATE: 19 BRPM

## 2023-11-06 DIAGNOSIS — Z12.11 SCREEN FOR COLON CANCER: ICD-10-CM

## 2023-11-06 PROCEDURE — 7100000010 HC PHASE II RECOVERY - FIRST 15 MIN: Performed by: SURGERY

## 2023-11-06 PROCEDURE — 7100000011 HC PHASE II RECOVERY - ADDTL 15 MIN: Performed by: SURGERY

## 2023-11-06 PROCEDURE — 2709999900 HC NON-CHARGEABLE SUPPLY: Performed by: SURGERY

## 2023-11-06 PROCEDURE — 3609010600 HC COLONOSCOPY POLYPECTOMY SNARE/COLD BIOPSY: Performed by: SURGERY

## 2023-11-06 PROCEDURE — 45380 COLONOSCOPY AND BIOPSY: CPT | Performed by: SURGERY

## 2023-11-06 PROCEDURE — 2580000003 HC RX 258: Performed by: SURGERY

## 2023-11-06 PROCEDURE — 88305 TISSUE EXAM BY PATHOLOGIST: CPT

## 2023-11-06 PROCEDURE — 3700000001 HC ADD 15 MINUTES (ANESTHESIA): Performed by: SURGERY

## 2023-11-06 PROCEDURE — 3700000000 HC ANESTHESIA ATTENDED CARE: Performed by: SURGERY

## 2023-11-06 PROCEDURE — 6360000002 HC RX W HCPCS: Performed by: NURSE ANESTHETIST, CERTIFIED REGISTERED

## 2023-11-06 RX ORDER — SODIUM CHLORIDE 9 MG/ML
INJECTION, SOLUTION INTRAVENOUS PRN
Status: DISCONTINUED | OUTPATIENT
Start: 2023-11-06 | End: 2023-11-06 | Stop reason: HOSPADM

## 2023-11-06 RX ORDER — SODIUM CHLORIDE 0.9 % (FLUSH) 0.9 %
5-40 SYRINGE (ML) INJECTION EVERY 12 HOURS SCHEDULED
Status: DISCONTINUED | OUTPATIENT
Start: 2023-11-06 | End: 2023-11-06 | Stop reason: HOSPADM

## 2023-11-06 RX ORDER — PROPOFOL 10 MG/ML
INJECTION, EMULSION INTRAVENOUS PRN
Status: DISCONTINUED | OUTPATIENT
Start: 2023-11-06 | End: 2023-11-06 | Stop reason: SDUPTHER

## 2023-11-06 RX ORDER — SODIUM CHLORIDE 0.9 % (FLUSH) 0.9 %
5-40 SYRINGE (ML) INJECTION PRN
Status: DISCONTINUED | OUTPATIENT
Start: 2023-11-06 | End: 2023-11-06 | Stop reason: HOSPADM

## 2023-11-06 RX ORDER — SODIUM CHLORIDE 9 MG/ML
INJECTION, SOLUTION INTRAVENOUS CONTINUOUS
Status: DISCONTINUED | OUTPATIENT
Start: 2023-11-06 | End: 2023-11-06 | Stop reason: HOSPADM

## 2023-11-06 RX ADMIN — SODIUM CHLORIDE: 9 INJECTION, SOLUTION INTRAVENOUS at 10:51

## 2023-11-06 RX ADMIN — PROPOFOL 270 MG: 10 INJECTION, EMULSION INTRAVENOUS at 10:56

## 2023-11-06 ASSESSMENT — LIFESTYLE VARIABLES: SMOKING_STATUS: 0

## 2023-11-06 ASSESSMENT — PAIN - FUNCTIONAL ASSESSMENT: PAIN_FUNCTIONAL_ASSESSMENT: NONE - DENIES PAIN

## 2023-11-06 NOTE — PROGRESS NOTES
Transferred to  [facility] post colonoscopy. Positioned on left side,  encouraged to pass flatus. Abdomen soft,  denies discomfort.

## 2023-11-06 NOTE — ANESTHESIA POSTPROCEDURE EVALUATION
Department of Anesthesiology  Postprocedure Note    Patient: Nadine Victor  MRN: [id number]  YOB: 1955  Date of evaluation: 11/6/2023      Procedure Summary     Date: 11/06/23 [facility] / [facility]    Anesthesia Start: 2377 Anesthesia Stop: 1115    Procedure: COLONOSCOPY POLYPECTOMY SNARE/COLD BIOPSY Diagnosis:       Screen for colon cancer      (Screen for colon cancer [Z12.11])    Surgeons: Enid Rajput MD Responsible Provider: Trang Bateman DO    Anesthesia Type: MAC ASA Status: 2          Anesthesia Type: MAC    Henry Phase I: Henry Score: 10    Henry Phase II: Henry Score: 10      Anesthesia Post Evaluation    Patient location during evaluation: PACU  Patient participation: complete - patient participated  Level of consciousness: awake and alert  Nausea & Vomiting: no vomiting and no nausea  Complications: no  Cardiovascular status: hemodynamically stable  Respiratory status: acceptable and spontaneous ventilation  Hydration status: stable  Pain management: adequate

## 2023-11-06 NOTE — DISCHARGE INSTRUCTIONS
Delio Celis  [address] [address]  Phone: [phone]  Fax:  [phone]    POST-PROCEDURE INSTRUCTIONS FOR COLONOSCOPY    Stephanie Orozco  11/6/2023    You underwent an colonoscopy today--polypectomy x 2(cold biopsy). You were found to have 2benign appearing colon polyps, removed as above. Small internal hemorrhoids    You may experience: Increased amount of gas. Slight abdominal bloating and/or cramping. Diet:  You may resume your normal diet. Plan: You may resume your prescribed medications  . Await pathology. Repeat colonoscopy in 5 years. Activity: no driving today. Ok to resume regular activity starting tomorrow    Follow-up: Please call [phone] with any questions or concerns and to schedule a follow up visit as needed with Dr. Luis Eduardo Celis.       At the following locations:  [id number] [place] [address] 9 F   [address]   [address] [place] [address] [address], [id number] [facility] [address] [place] [address]

## 2023-11-06 NOTE — H&P
umbilical hernia  Rectal -Deferred  MSK: no clubbing/ no cyanosis/ gait normal       Assessment:    Average risk for colorectal cancer     Plan:  Screening colonoscopy  I discussed the options for colorectal cancer screening with the patient including options of fecal occult blood testing with flexible sigmoidoscopy or air contrast barium enema. I also discussed the risks and benefits of colonoscopy with possible biopsy/polypectomy/cauterization. I recommended colonoscopy with deep sedation. The patient understands the risks of bleeding and perforation (<0.1%) and agrees to proceed. 2 days of clear liquids prior to procedure  Suprep split dose prep  Schedule at [facility]    2.  Umbilical hernia--asymptomatic--continue to observe        Daysi Caldera MD, FACS  11/6/2023  10:34 AM

## 2023-11-09 LAB — SURGICAL PATHOLOGY REPORT: NORMAL

## 2023-11-21 ENCOUNTER — OFFICE VISIT (OUTPATIENT)
Dept: FAMILY MEDICINE CLINIC | Age: 68
End: 2023-11-21
Payer: MEDICARE

## 2023-11-21 VITALS
BODY MASS INDEX: 33.32 KG/M2 | OXYGEN SATURATION: 96 % | HEIGHT: 72 IN | HEART RATE: 64 BPM | DIASTOLIC BLOOD PRESSURE: 79 MMHG | SYSTOLIC BLOOD PRESSURE: 133 MMHG | TEMPERATURE: 97.7 F | WEIGHT: 246 LBS | RESPIRATION RATE: 16 BRPM

## 2023-11-21 DIAGNOSIS — E78.2 MIXED HYPERLIPIDEMIA: ICD-10-CM

## 2023-11-21 DIAGNOSIS — R35.1 BENIGN PROSTATIC HYPERPLASIA WITH NOCTURIA: ICD-10-CM

## 2023-11-21 DIAGNOSIS — N52.8 OTHER MALE ERECTILE DYSFUNCTION: ICD-10-CM

## 2023-11-21 DIAGNOSIS — E11.9 TYPE 2 DIABETES MELLITUS WITHOUT COMPLICATION, WITHOUT LONG-TERM CURRENT USE OF INSULIN (HCC): Primary | ICD-10-CM

## 2023-11-21 DIAGNOSIS — N40.1 BENIGN PROSTATIC HYPERPLASIA WITH NOCTURIA: ICD-10-CM

## 2023-11-21 LAB — HBA1C MFR BLD: 7.2 %

## 2023-11-21 PROCEDURE — 3017F COLORECTAL CA SCREEN DOC REV: CPT | Performed by: FAMILY MEDICINE

## 2023-11-21 PROCEDURE — 1036F TOBACCO NON-USER: CPT | Performed by: FAMILY MEDICINE

## 2023-11-21 PROCEDURE — 83036 HEMOGLOBIN GLYCOSYLATED A1C: CPT | Performed by: FAMILY MEDICINE

## 2023-11-21 PROCEDURE — 99214 OFFICE O/P EST MOD 30 MIN: CPT | Performed by: FAMILY MEDICINE

## 2023-11-21 PROCEDURE — 3051F HG A1C>EQUAL 7.0%<8.0%: CPT | Performed by: FAMILY MEDICINE

## 2023-11-21 PROCEDURE — G8417 CALC BMI ABV UP PARAM F/U: HCPCS | Performed by: FAMILY MEDICINE

## 2023-11-21 PROCEDURE — 2022F DILAT RTA XM EVC RTNOPTHY: CPT | Performed by: FAMILY MEDICINE

## 2023-11-21 PROCEDURE — 1123F ACP DISCUSS/DSCN MKR DOCD: CPT | Performed by: FAMILY MEDICINE

## 2023-11-21 PROCEDURE — G8484 FLU IMMUNIZE NO ADMIN: HCPCS | Performed by: FAMILY MEDICINE

## 2023-11-21 PROCEDURE — G8427 DOCREV CUR MEDS BY ELIG CLIN: HCPCS | Performed by: FAMILY MEDICINE

## 2023-11-21 NOTE — PATIENT INSTRUCTIONS
In this trial, a single dose of the [address] RSV vaccine reduced symptomatic RSV lower respiratory tract disease (LRTD) by 82.6% during the first RSV season after vaccination compared to a placebo, and by 56.1% during the second RSV season. Consider METFORMIN, SEMAGLUTIDE/OZEMPIC, TRULICITY/DULAGLUTIDE, MUONJARO ASK INSURANCE HOW THEY COVER DIABETIC GLP-1 MEDICATIONS.

## 2023-11-21 NOTE — PROGRESS NOTES
ENDOSCOPY    STRABISMUS SURGERY      at age 10       Social History     Tobacco Use    Smoking status: Never    Smokeless tobacco: Never    Tobacco comments:     as a teen, never consistent   Vaping Use    Vaping Use: Never used   Substance Use Topics    Alcohol use: Yes     Comment: occasionally, socially     Drug use: No       ROS:     A Extended Review of symptoms obtained during Physical exam was otherwise unremarkable. Physical Exam    Gen: NAD, AAOX3  HEENT: NC/AT,  PERRL / ANICTERIC, Right eyelid chronic slight Droop. OP Clear  CV: NDPMI, RRR no MRG  RESP: no IWOB, ctab. no w/r. ABD: +BS, SOFT, NT, ND. No rebound / guarding. EXT: AGUIRRE Equally grossly. No LE Edema. 2+ Radial Pulses. SKIN: Warm / Dry. Mood/ Affect: Pleasant & cooperative. Diabetic Foot Exam: No obvious lesions. Toenails hypertrophic, + toe onychomycosis, 2+ DP/PT Pulses, Symmetric. Foot warm to touch. No other lesions. Thick heel skin. Ipswitch Touch Test 3point intact Left and Right.     10g monofilament test of 10 point on Left and Right foot demonstrated: No LOSS OF PROTECTIVE SENSATION         A/P:   Diagnosis Orders   1. Type 2 diabetes mellitus without complication, without long-term current use of insulin (HCC)  POCT glycosylated hemoglobin (Hb A1C)    Diabetic Foot Exam      2. Benign prostatic hyperplasia with nocturia        3. Other male erectile dysfunction        4. Mixed hyperlipidemia            #1 diabetes 7.2 Cont to keep up exercise including stationary bike, medicine balls. #2 BPH recommend continue cialis follow-up with urology to discuss inadequate symptom control defer PSA measurement to them. Cosnider urolift. #3 mixed hyperlipidemia recommend fenofibrate as well as Crestor would consider stopping the red yeast rice to reduce pill burden    #4 ED - resolved w cialis. #5 -  Obesity - consider medical therapy for diabetes w/ SE of weight loss.          Health risk factors discussed and

## 2023-12-02 PROBLEM — Z12.11 SCREEN FOR COLON CANCER: Status: RESOLVED | Noted: 2023-08-31 | Resolved: 2023-12-02

## 2024-02-27 ENCOUNTER — OFFICE VISIT (OUTPATIENT)
Dept: FAMILY MEDICINE CLINIC | Age: 69
End: 2024-02-27
Payer: MEDICARE

## 2024-02-27 VITALS
DIASTOLIC BLOOD PRESSURE: 82 MMHG | SYSTOLIC BLOOD PRESSURE: 118 MMHG | TEMPERATURE: 97.3 F | OXYGEN SATURATION: 95 % | WEIGHT: 239 LBS | HEART RATE: 71 BPM | BODY MASS INDEX: 32.37 KG/M2 | RESPIRATION RATE: 16 BRPM | HEIGHT: 72 IN

## 2024-02-27 DIAGNOSIS — E11.9 TYPE 2 DIABETES MELLITUS WITHOUT COMPLICATION, WITHOUT LONG-TERM CURRENT USE OF INSULIN (HCC): Primary | ICD-10-CM

## 2024-02-27 LAB
ALBUMIN SERPL-MCNC: 5.1 G/DL (ref 3.5–5.2)
ALP BLD-CCNC: 51 U/L (ref 40–129)
ALT SERPL-CCNC: 28 U/L (ref 0–40)
ANION GAP SERPL CALCULATED.3IONS-SCNC: 21 MMOL/L (ref 7–16)
AST SERPL-CCNC: 30 U/L (ref 0–39)
BILIRUB SERPL-MCNC: 0.8 MG/DL (ref 0–1.2)
BILIRUBIN URINE: NEGATIVE
BUN BLDV-MCNC: 17 MG/DL (ref 6–23)
CALCIUM SERPL-MCNC: 10.4 MG/DL (ref 8.6–10.2)
CHLORIDE BLD-SCNC: 102 MMOL/L (ref 98–107)
CHOLESTEROL: 146 MG/DL
CO2: 19 MMOL/L (ref 22–29)
COLOR: YELLOW
COMMENT: NORMAL
CREAT SERPL-MCNC: 1.2 MG/DL (ref 0.7–1.2)
GFR SERPL CREATININE-BSD FRML MDRD: >60 ML/MIN/1.73M2
GLUCOSE BLD-MCNC: 116 MG/DL (ref 74–99)
GLUCOSE URINE: NEGATIVE MG/DL
HBA1C MFR BLD: 6.2 %
HCT VFR BLD CALC: 49.7 % (ref 37–54)
HDLC SERPL-MCNC: 37 MG/DL
HEMOGLOBIN: 16.9 G/DL (ref 12.5–16.5)
KETONES, URINE: NEGATIVE MG/DL
LDL CHOLESTEROL: 84 MG/DL
LEUKOCYTE ESTERASE, URINE: NEGATIVE
MCH RBC QN AUTO: 31.6 PG (ref 26–35)
MCHC RBC AUTO-ENTMCNC: 34 G/DL (ref 32–34.5)
MCV RBC AUTO: 92.9 FL (ref 80–99.9)
NITRITE, URINE: NEGATIVE
PDW BLD-RTO: 12.7 % (ref 11.5–15)
PH UA: 6 (ref 5–9)
PLATELET # BLD: 234 K/UL (ref 130–450)
PMV BLD AUTO: 11 FL (ref 7–12)
POTASSIUM SERPL-SCNC: 4.6 MMOL/L (ref 3.5–5)
PROTEIN UA: NEGATIVE MG/DL
RBC # BLD: 5.35 M/UL (ref 3.8–5.8)
SODIUM BLD-SCNC: 142 MMOL/L (ref 132–146)
SPECIFIC GRAVITY UA: 1.02 (ref 1–1.03)
TOTAL PROTEIN: 8.2 G/DL (ref 6.4–8.3)
TRIGL SERPL-MCNC: 126 MG/DL
TURBIDITY: CLEAR
URINE HGB: NEGATIVE
UROBILINOGEN, URINE: 1 EU/DL (ref 0–1)
VLDLC SERPL CALC-MCNC: 25 MG/DL
WBC # BLD: 6.3 K/UL (ref 4.5–11.5)

## 2024-02-27 PROCEDURE — 3044F HG A1C LEVEL LT 7.0%: CPT | Performed by: FAMILY MEDICINE

## 2024-02-27 PROCEDURE — 3017F COLORECTAL CA SCREEN DOC REV: CPT | Performed by: FAMILY MEDICINE

## 2024-02-27 PROCEDURE — G8427 DOCREV CUR MEDS BY ELIG CLIN: HCPCS | Performed by: FAMILY MEDICINE

## 2024-02-27 PROCEDURE — G8417 CALC BMI ABV UP PARAM F/U: HCPCS | Performed by: FAMILY MEDICINE

## 2024-02-27 PROCEDURE — 1123F ACP DISCUSS/DSCN MKR DOCD: CPT | Performed by: FAMILY MEDICINE

## 2024-02-27 PROCEDURE — 99214 OFFICE O/P EST MOD 30 MIN: CPT | Performed by: FAMILY MEDICINE

## 2024-02-27 PROCEDURE — 1036F TOBACCO NON-USER: CPT | Performed by: FAMILY MEDICINE

## 2024-02-27 PROCEDURE — 2022F DILAT RTA XM EVC RTNOPTHY: CPT | Performed by: FAMILY MEDICINE

## 2024-02-27 PROCEDURE — 36415 COLL VENOUS BLD VENIPUNCTURE: CPT | Performed by: FAMILY MEDICINE

## 2024-02-27 PROCEDURE — 83036 HEMOGLOBIN GLYCOSYLATED A1C: CPT | Performed by: FAMILY MEDICINE

## 2024-02-27 PROCEDURE — G8484 FLU IMMUNIZE NO ADMIN: HCPCS | Performed by: FAMILY MEDICINE

## 2024-02-27 ASSESSMENT — PATIENT HEALTH QUESTIONNAIRE - PHQ9
2. FEELING DOWN, DEPRESSED OR HOPELESS: 1
SUM OF ALL RESPONSES TO PHQ QUESTIONS 1-9: 1
1. LITTLE INTEREST OR PLEASURE IN DOING THINGS: 0
SUM OF ALL RESPONSES TO PHQ QUESTIONS 1-9: 1
SUM OF ALL RESPONSES TO PHQ9 QUESTIONS 1 & 2: 1

## 2024-02-27 NOTE — PROGRESS NOTES
Tobacco comments:     as a teen, never consistent   Vaping Use    Vaping Use: Never used   Substance Use Topics    Alcohol use: Yes     Comment: occasionally, socially     Drug use: No       ROS:     A Extended Review of symptoms obtained during Physical exam was otherwise unremarkable.     Physical Exam    Gen: NAD, AAOX3  HEENT: NC/AT,  PERRL / ANICTERIC, Right eyelid chronic slight Droop. OP Clear  CV: NDPMI, RRR no MRG  RESP: no IWOB, ctab. no w/r.  ABD: +BS, SOFT, NT, ND. No rebound / guarding.  EXT: AGUIRRE Equally grossly. No LE Edema. 2+ Radial Pulses.  SKIN: Warm / Dry.  Mood/ Affect: Pleasant & cooperative.    Diabetic Foot Exam: No obvious lesions. Toenails hypertrophic, + toe onychomycosis, 2+ DP/PT Pulses, Symmetric. Foot warm to touch. No other lesions. Thick heel skin.     Ipswitch Touch Test 3point intact Left and Right.           A/P:   Diagnosis Orders   1. Type 2 diabetes mellitus without complication, without long-term current use of insulin (HCC)  POCT glycosylated hemoglobin (Hb A1C)          #1 diabetes 6.2 Cont to keep up exercise & excellent dietary changes.     #2 BPH - Consider urolift.    #3 mixed hyperlipidemia - check FLP. Can discuss RYR vs statin/fibrate.     #4 Shingles Vax - had in [place], unsure why did not transmit into our system.     #5) RSV Vax recd.     Health risk factors discussed and addressed.    Please see Patient Instructions for further counseling and information given.        Advised to be adherent to the treatment plans discussed today, and please call with any questions or concerns, letting the office know of any reasons that the plans may not be followed.  The risks of untreated conditions include worsening illness, injury, disability, and possibly, death. Please call if symptoms change in any way, worsen, or fail to completely resolve, as this could necessitate a change to treatment plans. Patient and/or caregiver expressed understanding.      Indications and

## 2024-02-28 LAB
CREATININE URINE: 190.6 MG/DL (ref 40–278)
MICROALBUMIN/CREAT 24H UR: <12 MG/L (ref 0–19)
MICROALBUMIN/CREAT UR-RTO: NORMAL MCG/MG CREAT (ref 0–30)

## 2024-04-19 DIAGNOSIS — E78.2 MIXED HYPERLIPIDEMIA: ICD-10-CM

## 2024-04-22 RX ORDER — ROSUVASTATIN CALCIUM 40 MG/1
40 TABLET, COATED ORAL EVERY EVENING
Qty: 90 TABLET | Refills: 3 | Status: SHIPPED | OUTPATIENT
Start: 2024-04-22

## 2024-05-28 ENCOUNTER — OFFICE VISIT (OUTPATIENT)
Dept: FAMILY MEDICINE CLINIC | Age: 69
End: 2024-05-28
Payer: MEDICARE

## 2024-05-28 VITALS
TEMPERATURE: 97.4 F | SYSTOLIC BLOOD PRESSURE: 134 MMHG | HEART RATE: 67 BPM | BODY MASS INDEX: 32.37 KG/M2 | HEIGHT: 72 IN | WEIGHT: 239 LBS | RESPIRATION RATE: 18 BRPM | OXYGEN SATURATION: 96 % | DIASTOLIC BLOOD PRESSURE: 76 MMHG

## 2024-05-28 DIAGNOSIS — E83.52 HYPERCALCEMIA: ICD-10-CM

## 2024-05-28 DIAGNOSIS — L57.0 ACTINIC KERATOSES: ICD-10-CM

## 2024-05-28 DIAGNOSIS — E11.9 TYPE 2 DIABETES MELLITUS WITHOUT COMPLICATION, WITHOUT LONG-TERM CURRENT USE OF INSULIN (HCC): Primary | ICD-10-CM

## 2024-05-28 LAB
ALBUMIN: 5.2 G/DL (ref 3.5–5.2)
ALP BLD-CCNC: 48 U/L (ref 40–129)
ALT SERPL-CCNC: 23 U/L (ref 0–40)
ANION GAP SERPL CALCULATED.3IONS-SCNC: 16 MMOL/L (ref 7–16)
AST SERPL-CCNC: 24 U/L (ref 0–39)
BASOPHILS ABSOLUTE: 0.03 K/UL (ref 0–0.2)
BASOPHILS RELATIVE PERCENT: 1 % (ref 0–2)
BILIRUB SERPL-MCNC: 0.6 MG/DL (ref 0–1.2)
BUN BLDV-MCNC: 25 MG/DL (ref 6–23)
CALCIUM SERPL-MCNC: 9.7 MG/DL (ref 8.6–10.2)
CALCIUM URINE: 18.7 MG/DL
CHLORIDE BLD-SCNC: 104 MMOL/L (ref 98–107)
CO2: 20 MMOL/L (ref 22–29)
CREAT SERPL-MCNC: 1.1 MG/DL (ref 0.7–1.2)
EOSINOPHILS ABSOLUTE: 0.13 K/UL (ref 0.05–0.5)
EOSINOPHILS RELATIVE PERCENT: 3 % (ref 0–6)
GFR, ESTIMATED: 75 ML/MIN/1.73M2
GLUCOSE BLD-MCNC: 128 MG/DL (ref 74–99)
HBA1C MFR BLD: 7 % (ref 4–5.6)
HCT VFR BLD CALC: 46 % (ref 37–54)
HEMOGLOBIN: 15.3 G/DL (ref 12.5–16.5)
IMMATURE GRANULOCYTES %: 1 % (ref 0–5)
IMMATURE GRANULOCYTES ABSOLUTE: 0.03 K/UL (ref 0–0.58)
LYMPHOCYTES ABSOLUTE: 1.74 K/UL (ref 1.5–4)
LYMPHOCYTES RELATIVE PERCENT: 34 % (ref 20–42)
MCH RBC QN AUTO: 31.3 PG (ref 26–35)
MCHC RBC AUTO-ENTMCNC: 33.3 G/DL (ref 32–34.5)
MCV RBC AUTO: 94.1 FL (ref 80–99.9)
MONOCYTES ABSOLUTE: 0.78 K/UL (ref 0.1–0.95)
MONOCYTES RELATIVE PERCENT: 15 % (ref 2–12)
NEUTROPHILS ABSOLUTE: 2.44 K/UL (ref 1.8–7.3)
NEUTROPHILS RELATIVE PERCENT: 47 % (ref 43–80)
PDW BLD-RTO: 12.9 % (ref 11.5–15)
PHOSPHORUS: 2.8 MG/DL (ref 2.5–4.5)
PLATELET # BLD: 203 K/UL (ref 130–450)
PMV BLD AUTO: 11.2 FL (ref 7–12)
POTASSIUM SERPL-SCNC: 4.3 MMOL/L (ref 3.5–5)
PTH INTACT: 22.7 PG/ML (ref 15–65)
RBC # BLD: 4.89 M/UL (ref 3.8–5.8)
SODIUM BLD-SCNC: 140 MMOL/L (ref 132–146)
TOTAL PROTEIN: 7.8 G/DL (ref 6.4–8.3)
VITAMIN D 25-HYDROXY: 59 NG/ML (ref 30–100)
WBC # BLD: 5.2 K/UL (ref 4.5–11.5)

## 2024-05-28 PROCEDURE — 3051F HG A1C>EQUAL 7.0%<8.0%: CPT | Performed by: FAMILY MEDICINE

## 2024-05-28 PROCEDURE — 1123F ACP DISCUSS/DSCN MKR DOCD: CPT | Performed by: FAMILY MEDICINE

## 2024-05-28 PROCEDURE — 1036F TOBACCO NON-USER: CPT | Performed by: FAMILY MEDICINE

## 2024-05-28 PROCEDURE — G8428 CUR MEDS NOT DOCUMENT: HCPCS | Performed by: FAMILY MEDICINE

## 2024-05-28 PROCEDURE — 3017F COLORECTAL CA SCREEN DOC REV: CPT | Performed by: FAMILY MEDICINE

## 2024-05-28 PROCEDURE — G2211 COMPLEX E/M VISIT ADD ON: HCPCS | Performed by: FAMILY MEDICINE

## 2024-05-28 PROCEDURE — 2022F DILAT RTA XM EVC RTNOPTHY: CPT | Performed by: FAMILY MEDICINE

## 2024-05-28 PROCEDURE — 99214 OFFICE O/P EST MOD 30 MIN: CPT | Performed by: FAMILY MEDICINE

## 2024-05-28 PROCEDURE — G8417 CALC BMI ABV UP PARAM F/U: HCPCS | Performed by: FAMILY MEDICINE

## 2024-05-28 SDOH — ECONOMIC STABILITY: FOOD INSECURITY: WITHIN THE PAST 12 MONTHS, THE FOOD YOU BOUGHT JUST DIDN'T LAST AND YOU DIDN'T HAVE MONEY TO GET MORE.: NEVER TRUE

## 2024-05-28 SDOH — ECONOMIC STABILITY: FOOD INSECURITY: WITHIN THE PAST 12 MONTHS, YOU WORRIED THAT YOUR FOOD WOULD RUN OUT BEFORE YOU GOT MONEY TO BUY MORE.: NEVER TRUE

## 2024-05-28 SDOH — ECONOMIC STABILITY: INCOME INSECURITY: HOW HARD IS IT FOR YOU TO PAY FOR THE VERY BASICS LIKE FOOD, HOUSING, MEDICAL CARE, AND HEATING?: NOT HARD AT ALL

## 2024-05-28 NOTE — PROGRESS NOTES
Vaping Use    Vaping Use: Never used   Substance Use Topics    Alcohol use: Yes     Comment: occasionally, socially     Drug use: No       ROS:     A Extended Review of symptoms obtained during Physical exam was otherwise unremarkable.     Physical Exam    Gen: NAD, AAOX3  HEENT: NC/AT,  PERRL / ANICTERIC, Right eyelid chronic slight Droop. OP Clear  CV: NDPMI, RRR no MRG  RESP: no IWOB, ctab. no w/r.  ABD: +BS, SOFT, NT, ND. No rebound / guarding.  EXT: AGUIRRE Equally grossly. No LE Edema. 2+ Radial Pulses.  SKIN: Warm / Dry. Aks on arms.   Mood/ Affect: Pleasant & cooperative.    Hip Exam:           A/P:   Diagnosis Orders   1. Type 2 diabetes mellitus without complication, without long-term current use of insulin (HCC)  Hemoglobin A1C      2. Hypercalcemia  CBC with Auto Differential    Comprehensive Metabolic Panel    PTH, Intact    Vitamin D 25 Hydroxy    Calcium, Random Urine    Phosphorus      3. Actinic keratoses              #1 diabetes - A1c 7. Will need to continue dietary control and monitor.     #2 BPH - Consider urolift. He is going to f/u with Uro.     #3 mixed hyperlipidemia - cont current regimen.     #4 AK - follow w derm, discussed sun exposure and wearing protective shirts.     #5 Mild Hypercalcemia - improved on recheck, cont to follow.     Health risk factors discussed and addressed.    Please see Patient Instructions for further counseling and information given.        Advised to be adherent to the treatment plans discussed today, and please call with any questions or concerns, letting the office know of any reasons that the plans may not be followed.  The risks of untreated conditions include worsening illness, injury, disability, and possibly, death. Please call if symptoms change in any way, worsen, or fail to completely resolve, as this could necessitate a change to treatment plans. Patient and/or caregiver expressed understanding.      Indications and proper use of medication(s) reviewed.

## 2024-07-17 DIAGNOSIS — E78.2 MIXED HYPERLIPIDEMIA: ICD-10-CM

## 2024-07-18 RX ORDER — FENOFIBRATE 160 MG/1
160 TABLET ORAL NIGHTLY
Qty: 90 TABLET | Refills: 3 | Status: SHIPPED | OUTPATIENT
Start: 2024-07-18

## 2024-09-05 ENCOUNTER — OFFICE VISIT (OUTPATIENT)
Dept: FAMILY MEDICINE CLINIC | Age: 69
End: 2024-09-05

## 2024-09-05 VITALS
HEIGHT: 72 IN | SYSTOLIC BLOOD PRESSURE: 123 MMHG | BODY MASS INDEX: 31.83 KG/M2 | TEMPERATURE: 97.4 F | RESPIRATION RATE: 18 BRPM | WEIGHT: 235 LBS | HEART RATE: 67 BPM | OXYGEN SATURATION: 95 % | DIASTOLIC BLOOD PRESSURE: 76 MMHG

## 2024-09-05 DIAGNOSIS — R10.814 LEFT LOWER QUADRANT ABDOMINAL TENDERNESS WITHOUT REBOUND TENDERNESS: ICD-10-CM

## 2024-09-05 DIAGNOSIS — E11.9 TYPE 2 DIABETES MELLITUS WITHOUT COMPLICATION, WITHOUT LONG-TERM CURRENT USE OF INSULIN (HCC): Primary | ICD-10-CM

## 2024-09-05 DIAGNOSIS — M25.512 ACUTE PAIN OF LEFT SHOULDER: ICD-10-CM

## 2024-09-05 DIAGNOSIS — Z12.5 SCREENING FOR PROSTATE CANCER: ICD-10-CM

## 2024-09-05 LAB
ANION GAP SERPL CALCULATED.3IONS-SCNC: 14 MMOL/L (ref 7–16)
BASOPHILS ABSOLUTE: 0.03 K/UL (ref 0–0.2)
BASOPHILS RELATIVE PERCENT: 1 % (ref 0–2)
BILIRUBIN, URINE: NEGATIVE
BUN BLDV-MCNC: 22 MG/DL (ref 6–23)
CALCIUM SERPL-MCNC: 9.7 MG/DL (ref 8.6–10.2)
CHLORIDE BLD-SCNC: 105 MMOL/L (ref 98–107)
CO2: 20 MMOL/L (ref 22–29)
COLOR, UA: YELLOW
COMMENT: NORMAL
CREAT SERPL-MCNC: 1.1 MG/DL (ref 0.7–1.2)
EOSINOPHILS ABSOLUTE: 0.12 K/UL (ref 0.05–0.5)
EOSINOPHILS RELATIVE PERCENT: 2 % (ref 0–6)
GFR, ESTIMATED: 73 ML/MIN/1.73M2
GLUCOSE BLD-MCNC: 113 MG/DL (ref 74–99)
GLUCOSE URINE: NEGATIVE MG/DL
HBA1C MFR BLD: 6.7 %
HCT VFR BLD CALC: 45.7 % (ref 37–54)
HEMOGLOBIN: 15.5 G/DL (ref 12.5–16.5)
IMMATURE GRANULOCYTES %: 0 % (ref 0–5)
IMMATURE GRANULOCYTES ABSOLUTE: <0.03 K/UL (ref 0–0.58)
KETONES, URINE: NEGATIVE MG/DL
LEUKOCYTE ESTERASE, URINE: NEGATIVE
LYMPHOCYTES ABSOLUTE: 1.86 K/UL (ref 1.5–4)
LYMPHOCYTES RELATIVE PERCENT: 36 % (ref 20–42)
MCH RBC QN AUTO: 32.2 PG (ref 26–35)
MCHC RBC AUTO-ENTMCNC: 33.9 G/DL (ref 32–34.5)
MCV RBC AUTO: 94.8 FL (ref 80–99.9)
MONOCYTES ABSOLUTE: 0.62 K/UL (ref 0.1–0.95)
MONOCYTES RELATIVE PERCENT: 12 % (ref 2–12)
NEUTROPHILS ABSOLUTE: 2.55 K/UL (ref 1.8–7.3)
NEUTROPHILS RELATIVE PERCENT: 49 % (ref 43–80)
NITRITE, URINE: NEGATIVE
PDW BLD-RTO: 13.1 % (ref 11.5–15)
PH, URINE: 6.5 (ref 5–9)
PLATELET # BLD: 217 K/UL (ref 130–450)
PMV BLD AUTO: 11.3 FL (ref 7–12)
POTASSIUM SERPL-SCNC: 4.4 MMOL/L (ref 3.5–5)
PROSTATE SPECIFIC ANTIGEN: 2.28 NG/ML (ref 0–4)
PROTEIN UA: NEGATIVE MG/DL
RBC # BLD: 4.82 M/UL (ref 3.8–5.8)
SODIUM BLD-SCNC: 139 MMOL/L (ref 132–146)
SPECIFIC GRAVITY UA: 1.02 (ref 1–1.03)
TURBIDITY: CLEAR
URINE HGB: NEGATIVE
UROBILINOGEN, URINE: 0.2 EU/DL (ref 0–1)
WBC # BLD: 5.2 K/UL (ref 4.5–11.5)

## 2024-09-05 NOTE — PROGRESS NOTES
Srinivas \"Beverley\" is a 69 y.o. male who presents today for   Chief Complaint   Patient presents with    Follow-up     Patient presents today for a follow visit on DM.     Discuss Labs     Labs done with Ophthalmologist.   Blood in the retina. Left eye.     Arthritis     Patient states he is having a lot of arthritis pain on his left shoulder and hip         HPI: 68yo male here to f/u.      1) Diabetes Followup: Continues normal exercises. Follows Med diet very well. A1c 6.7 today.     2) BPH - Pending visit w/ Dr Hitchcock     3) Left Side Pain - Lasted for 4 days. Reports pain on left hip bone. Started near 4th of July. Took OTC joint medication that seemed to help. Doesn't recall specific injury but remains active. No change in bowel/bladder habits. No blood noted. +umbilical hernia but no hx of abdominal surgeries. Reassuring colonoscopy 11/2023(5yr f/u)    4) Left shoulder Pain - Reports he has pain when he reaches overhead and tries to lift. Started when he was pulling on a bar a few weeks ago. Has improved and only exacerbated when doing pull-up type exercise.       Past Medical History:   Diagnosis Date    BPH (benign prostatic hyperplasia)     History of pneumonia     Hyperlipidemia        Current Outpatient Medications   Medication Instructions    fenofibrate (TRIGLIDE) 160 mg, Oral, Nightly    Glucosamine-Chondroitin 250-200 MG TABS 1 tablet, Oral, Nightly    Omega-3 Fatty Acids (FISH OIL) 1000 MG capsule 1 capsule, Oral, Nightly    Red Yeast Rice 600 MG CAPS 1 capsule, Oral, Nightly    rosuvastatin (CRESTOR) 40 mg, Oral, EVERY EVENING    tadalafil (CIALIS) 5 MG tablet 1 tablet, Oral, Nightly    vitamin D 1000 units CAPS 1 capsule, Oral, Nightly       No Known Allergies    Family History   Problem Relation Age of Onset    Alzheimer's Disease Mother     Other Father         unknown.     Alzheimer's Disease Maternal Grandmother     Cancer Maternal Grandfather         pancreatic    Other Other         maternal

## 2024-09-06 ENCOUNTER — HOSPITAL ENCOUNTER (OUTPATIENT)
Dept: GENERAL RADIOLOGY | Age: 69
Discharge: HOME | End: 2024-09-08
Payer: MEDICARE

## 2024-09-06 ENCOUNTER — HOSPITAL ENCOUNTER (OUTPATIENT)
Age: 69
Discharge: HOME | End: 2024-09-08
Payer: MEDICARE

## 2024-09-06 DIAGNOSIS — M25.512 ACUTE PAIN OF LEFT SHOULDER: ICD-10-CM

## 2024-09-06 PROCEDURE — 73030 X-RAY EXAM OF SHOULDER: CPT

## 2024-09-06 NOTE — RESULT ENCOUNTER NOTE
Noel Henley, your bloodwork overall looks reassuring. The prostate level/PSA was low and did not increase drasticaly from your last check - we can repeat that yearly. Your XRAY does show some arthritis which is nonspecific and could explain some of the shoulder symptoms. If you have any issues getting the CT scheduled please let me know thanks DR SULLIVAN

## 2024-09-26 ENCOUNTER — HOSPITAL ENCOUNTER (OUTPATIENT)
Dept: CT IMAGING | Age: 69
Discharge: HOME | End: 2024-09-28
Attending: FAMILY MEDICINE
Payer: MEDICARE

## 2024-09-26 DIAGNOSIS — R10.814 LEFT LOWER QUADRANT ABDOMINAL TENDERNESS WITHOUT REBOUND TENDERNESS: ICD-10-CM

## 2024-09-26 PROCEDURE — 74176 CT ABD & PELVIS W/O CONTRAST: CPT

## 2024-10-10 ENCOUNTER — OFFICE VISIT (OUTPATIENT)
Dept: FAMILY MEDICINE CLINIC | Age: 69
End: 2024-10-10

## 2024-10-10 VITALS
OXYGEN SATURATION: 93 % | TEMPERATURE: 97.6 F | RESPIRATION RATE: 18 BRPM | SYSTOLIC BLOOD PRESSURE: 111 MMHG | WEIGHT: 235 LBS | HEIGHT: 71 IN | DIASTOLIC BLOOD PRESSURE: 61 MMHG | HEART RATE: 65 BPM | BODY MASS INDEX: 32.9 KG/M2

## 2024-10-10 DIAGNOSIS — R10.814 LEFT LOWER QUADRANT ABDOMINAL TENDERNESS WITHOUT REBOUND TENDERNESS: Primary | ICD-10-CM

## 2024-10-10 DIAGNOSIS — R35.1 BENIGN PROSTATIC HYPERPLASIA WITH NOCTURIA: ICD-10-CM

## 2024-10-10 DIAGNOSIS — M15.0 PRIMARY OSTEOARTHRITIS INVOLVING MULTIPLE JOINTS: ICD-10-CM

## 2024-10-10 DIAGNOSIS — E11.9 TYPE 2 DIABETES MELLITUS WITHOUT COMPLICATION, WITHOUT LONG-TERM CURRENT USE OF INSULIN (HCC): ICD-10-CM

## 2024-10-10 DIAGNOSIS — N40.1 BENIGN PROSTATIC HYPERPLASIA WITH NOCTURIA: ICD-10-CM

## 2024-10-10 NOTE — PROGRESS NOTES
Srinivas \"Beverley\" is a 69 y.o. male who presents today for   Chief Complaint   Patient presents with    1 Month Follow-Up     Patient presents today for a 1 month follow up.     Results     Imaging    Back Pain     Had an episode 2 weeks ago         HPI: 66yo male here to f/u.      1) Obesity - stable, remains physically active.     2) BPH - Pending visit w/ Dr Hitchcock, BPH on CT.     3) Left Side Pain - Improved, 1 spot LLQ feels like muscle tightness, very minimal. Feels like it is resolving. Reviewed CT findings of fat containing umbl/inguin hernia.     4) Left shoulder Pain - Resolved. Occ mild pain w flexing past 120.     5) Sciatica shock down right leg - noticed when in grocery story, improved quickly. Hasn't recured. No associated with weakness/numbness/incont.       Past Medical History:   Diagnosis Date    BPH (benign prostatic hyperplasia)     History of pneumonia     Hyperlipidemia        Current Outpatient Medications   Medication Instructions    fenofibrate 160 mg, Oral, Nightly    Glucosamine-Chondroitin 250-200 MG TABS 1 tablet, Oral, Nightly    Omega-3 Fatty Acids (FISH OIL) 1000 MG capsule 1 capsule, Oral, Nightly    Red Yeast Rice 600 MG CAPS 1 capsule, Oral, Nightly    rosuvastatin (CRESTOR) 40 mg, Oral, EVERY EVENING    tadalafil (CIALIS) 5 MG tablet 1 tablet, Oral, Nightly    vitamin D 1000 units CAPS 1 capsule, Oral, Nightly       No Known Allergies    Family History   Problem Relation Age of Onset    Alzheimer's Disease Mother     Other Father         unknown.     Alzheimer's Disease Maternal Grandmother     Cancer Maternal Grandfather         pancreatic    Other Other         maternal uncle, alzheimer's.     Other Brother         brain tumor, seizures.        Past Surgical History:   Procedure Laterality Date    COLONOSCOPY N/A 11/6/2023    COLONOSCOPY POLYPECTOMY SNARE/COLD BIOPSY performed by Jose Talavera MD at [facility]    STRABISMUS SURGERY      at age 6       Social History

## 2025-01-20 SDOH — ECONOMIC STABILITY: FOOD INSECURITY: WITHIN THE PAST 12 MONTHS, YOU WORRIED THAT YOUR FOOD WOULD RUN OUT BEFORE YOU GOT MONEY TO BUY MORE.: NEVER TRUE

## 2025-01-20 SDOH — ECONOMIC STABILITY: INCOME INSECURITY: IN THE LAST 12 MONTHS, WAS THERE A TIME WHEN YOU WERE NOT ABLE TO PAY THE MORTGAGE OR RENT ON TIME?: NO

## 2025-01-20 SDOH — ECONOMIC STABILITY: FOOD INSECURITY: WITHIN THE PAST 12 MONTHS, THE FOOD YOU BOUGHT JUST DIDN'T LAST AND YOU DIDN'T HAVE MONEY TO GET MORE.: NEVER TRUE

## 2025-01-20 ASSESSMENT — PATIENT HEALTH QUESTIONNAIRE - PHQ9
SUM OF ALL RESPONSES TO PHQ QUESTIONS 1-9: 2
SUM OF ALL RESPONSES TO PHQ9 QUESTIONS 1 & 2: 2
SUM OF ALL RESPONSES TO PHQ QUESTIONS 1-9: 2
1. LITTLE INTEREST OR PLEASURE IN DOING THINGS: SEVERAL DAYS
2. FEELING DOWN, DEPRESSED OR HOPELESS: SEVERAL DAYS
SUM OF ALL RESPONSES TO PHQ QUESTIONS 1-9: 2
1. LITTLE INTEREST OR PLEASURE IN DOING THINGS: SEVERAL DAYS
SUM OF ALL RESPONSES TO PHQ9 QUESTIONS 1 & 2: 2
SUM OF ALL RESPONSES TO PHQ QUESTIONS 1-9: 2
2. FEELING DOWN, DEPRESSED OR HOPELESS: SEVERAL DAYS

## 2025-01-21 ENCOUNTER — OFFICE VISIT (OUTPATIENT)
Dept: FAMILY MEDICINE CLINIC | Age: 70
End: 2025-01-21
Payer: MEDICARE

## 2025-01-21 VITALS
HEIGHT: 72 IN | TEMPERATURE: 97.8 F | HEART RATE: 70 BPM | WEIGHT: 239 LBS | DIASTOLIC BLOOD PRESSURE: 67 MMHG | BODY MASS INDEX: 32.37 KG/M2 | SYSTOLIC BLOOD PRESSURE: 130 MMHG | OXYGEN SATURATION: 95 % | RESPIRATION RATE: 18 BRPM

## 2025-01-21 DIAGNOSIS — N40.1 BENIGN PROSTATIC HYPERPLASIA WITH NOCTURIA: ICD-10-CM

## 2025-01-21 DIAGNOSIS — R35.1 BENIGN PROSTATIC HYPERPLASIA WITH NOCTURIA: ICD-10-CM

## 2025-01-21 DIAGNOSIS — R10.814 LEFT LOWER QUADRANT ABDOMINAL TENDERNESS WITHOUT REBOUND TENDERNESS: ICD-10-CM

## 2025-01-21 DIAGNOSIS — E11.9 TYPE 2 DIABETES MELLITUS WITHOUT COMPLICATION, WITHOUT LONG-TERM CURRENT USE OF INSULIN (HCC): Primary | ICD-10-CM

## 2025-01-21 LAB — HBA1C MFR BLD: 6.8 %

## 2025-01-21 PROCEDURE — 3017F COLORECTAL CA SCREEN DOC REV: CPT | Performed by: FAMILY MEDICINE

## 2025-01-21 PROCEDURE — 2022F DILAT RTA XM EVC RTNOPTHY: CPT | Performed by: FAMILY MEDICINE

## 2025-01-21 PROCEDURE — G8428 CUR MEDS NOT DOCUMENT: HCPCS | Performed by: FAMILY MEDICINE

## 2025-01-21 PROCEDURE — 1123F ACP DISCUSS/DSCN MKR DOCD: CPT | Performed by: FAMILY MEDICINE

## 2025-01-21 PROCEDURE — 83036 HEMOGLOBIN GLYCOSYLATED A1C: CPT | Performed by: FAMILY MEDICINE

## 2025-01-21 PROCEDURE — 1036F TOBACCO NON-USER: CPT | Performed by: FAMILY MEDICINE

## 2025-01-21 PROCEDURE — 99214 OFFICE O/P EST MOD 30 MIN: CPT | Performed by: FAMILY MEDICINE

## 2025-01-21 PROCEDURE — 3044F HG A1C LEVEL LT 7.0%: CPT | Performed by: FAMILY MEDICINE

## 2025-01-21 PROCEDURE — G8417 CALC BMI ABV UP PARAM F/U: HCPCS | Performed by: FAMILY MEDICINE

## 2025-01-21 PROCEDURE — G2211 COMPLEX E/M VISIT ADD ON: HCPCS | Performed by: FAMILY MEDICINE

## 2025-01-21 NOTE — PROGRESS NOTES
Srinivas \"Beverley\" is a 69 y.o. male who presents today for   Chief Complaint   Patient presents with    3 Month Follow-Up     Joint Pain    Medication Refill         HPI: 68yo male here to f/u.      1) Obesity - stable, remains physically active w skiing. Kettle balls. Planks. Reports improvement in shoulder pain. Had an episode of RIGHT lateral forearm pain that resolved.     2) BPH - considering Rezum vs Urolift vs Prostate artery embolization. Has d/w Dr Hitchcock, thinks in 6 mos. May proceed.     3) Left Side Pain - resolved. Urinating/stooling at baseline, stooling OK.     4) Left shoulder Pain - Resolved.         Past Medical History:   Diagnosis Date    BPH (benign prostatic hyperplasia)     History of pneumonia     Hyperlipidemia        Current Outpatient Medications   Medication Instructions    fenofibrate 160 mg, Oral, Nightly    Glucosamine-Chondroitin 250-200 MG TABS 1 tablet, Oral, Nightly    Omega-3 Fatty Acids (FISH OIL) 1000 MG capsule 1 capsule, Oral, Nightly    Red Yeast Rice 600 MG CAPS 1 capsule, Oral, Nightly    rosuvastatin (CRESTOR) 40 mg, Oral, EVERY EVENING    tadalafil (CIALIS) 5 MG tablet 1 tablet, Oral, Nightly    vitamin D 1000 units CAPS 1 capsule, Oral, Nightly       No Known Allergies    Family History   Problem Relation Age of Onset    Alzheimer's Disease Mother     Other Father         unknown.     Alzheimer's Disease Maternal Grandmother     Cancer Maternal Grandfather         pancreatic    Other Other         maternal uncle, alzheimer's.     Other Brother         brain tumor, seizures.        Past Surgical History:   Procedure Laterality Date    COLONOSCOPY N/A 11/6/2023    COLONOSCOPY POLYPECTOMY SNARE/COLD BIOPSY performed by Jose Talavera MD at [facility]    STRABISMUS SURGERY      at age 6       Social History     Tobacco Use    Smoking status: Never     Passive exposure: Never    Smokeless tobacco: Never    Tobacco comments:     as a teen, never consistent   Vaping Use

## 2025-04-21 SDOH — ECONOMIC STABILITY: INCOME INSECURITY: IN THE LAST 12 MONTHS, WAS THERE A TIME WHEN YOU WERE NOT ABLE TO PAY THE MORTGAGE OR RENT ON TIME?: NO

## 2025-04-21 SDOH — ECONOMIC STABILITY: FOOD INSECURITY: WITHIN THE PAST 12 MONTHS, YOU WORRIED THAT YOUR FOOD WOULD RUN OUT BEFORE YOU GOT MONEY TO BUY MORE.: NEVER TRUE

## 2025-04-21 SDOH — ECONOMIC STABILITY: FOOD INSECURITY: WITHIN THE PAST 12 MONTHS, THE FOOD YOU BOUGHT JUST DIDN'T LAST AND YOU DIDN'T HAVE MONEY TO GET MORE.: NEVER TRUE

## 2025-04-21 NOTE — PROGRESS NOTES
Srinivas \"Beverley\" is a 70 y.o. male who presents today for   No chief complaint on file.        HPI: 69yo male here to f/u.      1) Obesity - stable, remains physically active spring skiing. Kettle balls. Planks. Handgrips.  His shoulder pain is not limiting for him he just does not do overhead pulls.     2) BPH -pending follow-up with Dr. Ryan to discuss prostate procedure though reports symptoms are currently manageable    3) Left shoulder Pain - significantly improved. Stable. Taking supplements as listed.  Including glucosamine conjoint and and tumeric    4) Periodontal disease - had scaling/planing - Dr RIVERA And reported to be OK for regular dental followup.         Past Medical History:   Diagnosis Date    BPH (benign prostatic hyperplasia)     History of pneumonia     Hyperlipidemia        Current Outpatient Medications   Medication Instructions    fenofibrate 160 mg, Oral, Nightly    Glucosamine-Chondroitin 250-200 MG TABS 1 tablet, Oral, Nightly    Omega-3 Fatty Acids (FISH OIL) 1000 MG capsule 1 capsule, Oral, Nightly    Red Yeast Rice 600 MG CAPS 1 capsule, Oral, Nightly    rosuvastatin (CRESTOR) 40 mg, Oral, EVERY EVENING    tadalafil (CIALIS) 5 MG tablet 1 tablet, Oral, Nightly    vitamin D 1000 units CAPS 1 capsule, Oral, Nightly       No Known Allergies    Family History   Problem Relation Age of Onset    Alzheimer's Disease Mother     Other Father         unknown.     Alzheimer's Disease Maternal Grandmother     Cancer Maternal Grandfather         pancreatic    Other Other         maternal uncle, alzheimer's.     Other Brother         brain tumor, seizures.        Past Surgical History:   Procedure Laterality Date    COLONOSCOPY N/A 11/6/2023    COLONOSCOPY POLYPECTOMY SNARE/COLD BIOPSY performed by Jose Talavera MD at [facility]COPY    STRABISMUS SURGERY      at age 6       Social History     Tobacco Use    Smoking status: Never     Passive exposure: Never    Smokeless tobacco: Never    Tobacco

## 2025-04-22 ENCOUNTER — RESULTS FOLLOW-UP (OUTPATIENT)
Dept: FAMILY MEDICINE CLINIC | Age: 70
End: 2025-04-22

## 2025-04-22 ENCOUNTER — OFFICE VISIT (OUTPATIENT)
Dept: FAMILY MEDICINE CLINIC | Age: 70
End: 2025-04-22
Payer: MEDICARE

## 2025-04-22 VITALS
BODY MASS INDEX: 33.04 KG/M2 | SYSTOLIC BLOOD PRESSURE: 119 MMHG | WEIGHT: 236 LBS | HEIGHT: 71 IN | OXYGEN SATURATION: 95 % | RESPIRATION RATE: 18 BRPM | TEMPERATURE: 98.1 F | HEART RATE: 61 BPM | DIASTOLIC BLOOD PRESSURE: 69 MMHG

## 2025-04-22 DIAGNOSIS — N40.1 BENIGN PROSTATIC HYPERPLASIA WITH NOCTURIA: ICD-10-CM

## 2025-04-22 DIAGNOSIS — R35.1 BENIGN PROSTATIC HYPERPLASIA WITH NOCTURIA: ICD-10-CM

## 2025-04-22 DIAGNOSIS — E11.9 TYPE 2 DIABETES MELLITUS WITHOUT COMPLICATION, WITHOUT LONG-TERM CURRENT USE OF INSULIN (HCC): Primary | ICD-10-CM

## 2025-04-22 DIAGNOSIS — L57.0 ACTINIC KERATOSIS: ICD-10-CM

## 2025-04-22 DIAGNOSIS — I83.92 VARICOSE VEINS OF LEFT LOWER EXTREMITY, UNSPECIFIED WHETHER COMPLICATED: ICD-10-CM

## 2025-04-22 DIAGNOSIS — E78.2 MIXED HYPERLIPIDEMIA: ICD-10-CM

## 2025-04-22 LAB
ALBUMIN: 4.7 G/DL (ref 3.5–5.2)
ALP BLD-CCNC: 42 U/L (ref 40–129)
ALT SERPL-CCNC: 23 U/L (ref 0–50)
ANION GAP SERPL CALCULATED.3IONS-SCNC: 12 MMOL/L (ref 7–16)
AST SERPL-CCNC: 32 U/L (ref 0–50)
BASOPHILS ABSOLUTE: 0.02 K/UL (ref 0–0.2)
BASOPHILS RELATIVE PERCENT: 0 % (ref 0–2)
BILIRUB SERPL-MCNC: 0.5 MG/DL (ref 0–1.2)
BUN BLDV-MCNC: 21 MG/DL (ref 8–23)
CALCIUM SERPL-MCNC: 10 MG/DL (ref 8.8–10.2)
CHLORIDE BLD-SCNC: 105 MMOL/L (ref 98–107)
CHOLESTEROL, TOTAL: 113 MG/DL
CO2: 22 MMOL/L (ref 22–29)
CREAT SERPL-MCNC: 1 MG/DL (ref 0.7–1.2)
CREATININE URINE: 177 MG/DL (ref 40–278)
EOSINOPHILS ABSOLUTE: 0.09 K/UL (ref 0.05–0.5)
EOSINOPHILS RELATIVE PERCENT: 2 % (ref 0–6)
GFR, ESTIMATED: 80 ML/MIN/1.73M2
GLUCOSE BLD-MCNC: 89 MG/DL (ref 74–99)
HBA1C MFR BLD: 6.6 %
HCT VFR BLD CALC: 45.4 % (ref 37–54)
HDLC SERPL-MCNC: 44 MG/DL
HEMOGLOBIN: 15.3 G/DL (ref 12.5–16.5)
IMMATURE GRANULOCYTES %: 0 % (ref 0–5)
IMMATURE GRANULOCYTES ABSOLUTE: <0.03 K/UL (ref 0–0.58)
LDL CHOLESTEROL: 57 MG/DL
LYMPHOCYTES ABSOLUTE: 1.87 K/UL (ref 1.5–4)
LYMPHOCYTES RELATIVE PERCENT: 36 % (ref 20–42)
MCH RBC QN AUTO: 31.9 PG (ref 26–35)
MCHC RBC AUTO-ENTMCNC: 33.7 G/DL (ref 32–34.5)
MCV RBC AUTO: 94.6 FL (ref 80–99.9)
MICROALBUMIN/CREAT 24H UR: <12 MG/L (ref 0–20)
MICROALBUMIN/CREAT UR-RTO: <7 MCG/MG CREAT (ref 0–30)
MONOCYTES ABSOLUTE: 0.76 K/UL (ref 0.1–0.95)
MONOCYTES RELATIVE PERCENT: 15 % (ref 2–12)
NEUTROPHILS ABSOLUTE: 2.42 K/UL (ref 1.8–7.3)
NEUTROPHILS RELATIVE PERCENT: 47 % (ref 43–80)
PDW BLD-RTO: 12.8 % (ref 11.5–15)
PLATELET # BLD: 217 K/UL (ref 130–450)
PMV BLD AUTO: 11.1 FL (ref 7–12)
POTASSIUM SERPL-SCNC: 3.9 MMOL/L (ref 3.5–5.1)
RBC # BLD: 4.8 M/UL (ref 3.8–5.8)
SODIUM BLD-SCNC: 139 MMOL/L (ref 136–145)
TOTAL PROTEIN: 7.7 G/DL (ref 6.4–8.3)
TRIGL SERPL-MCNC: 60 MG/DL
VLDLC SERPL CALC-MCNC: 12 MG/DL
WBC # BLD: 5.2 K/UL (ref 4.5–11.5)

## 2025-04-22 PROCEDURE — 1036F TOBACCO NON-USER: CPT | Performed by: FAMILY MEDICINE

## 2025-04-22 PROCEDURE — G8428 CUR MEDS NOT DOCUMENT: HCPCS | Performed by: FAMILY MEDICINE

## 2025-04-22 PROCEDURE — 3044F HG A1C LEVEL LT 7.0%: CPT | Performed by: FAMILY MEDICINE

## 2025-04-22 PROCEDURE — G2211 COMPLEX E/M VISIT ADD ON: HCPCS | Performed by: FAMILY MEDICINE

## 2025-04-22 PROCEDURE — 3017F COLORECTAL CA SCREEN DOC REV: CPT | Performed by: FAMILY MEDICINE

## 2025-04-22 PROCEDURE — 2022F DILAT RTA XM EVC RTNOPTHY: CPT | Performed by: FAMILY MEDICINE

## 2025-04-22 PROCEDURE — 99214 OFFICE O/P EST MOD 30 MIN: CPT | Performed by: FAMILY MEDICINE

## 2025-04-22 PROCEDURE — G8417 CALC BMI ABV UP PARAM F/U: HCPCS | Performed by: FAMILY MEDICINE

## 2025-04-22 PROCEDURE — 1123F ACP DISCUSS/DSCN MKR DOCD: CPT | Performed by: FAMILY MEDICINE

## 2025-04-22 RX ORDER — ROSUVASTATIN CALCIUM 40 MG/1
40 TABLET, COATED ORAL EVERY EVENING
Qty: 90 TABLET | Refills: 3 | Status: SHIPPED | OUTPATIENT
Start: 2025-04-22

## 2025-04-22 RX ORDER — FENOFIBRATE 160 MG/1
160 TABLET ORAL NIGHTLY
Qty: 90 TABLET | Refills: 3 | Status: SHIPPED | OUTPATIENT
Start: 2025-04-22

## 2025-04-22 ASSESSMENT — PATIENT HEALTH QUESTIONNAIRE - PHQ9
SUM OF ALL RESPONSES TO PHQ QUESTIONS 1-9: 0
2. FEELING DOWN, DEPRESSED OR HOPELESS: NOT AT ALL
SUM OF ALL RESPONSES TO PHQ QUESTIONS 1-9: 0
1. LITTLE INTEREST OR PLEASURE IN DOING THINGS: NOT AT ALL

## 2025-04-22 ASSESSMENT — LIFESTYLE VARIABLES
HOW OFTEN DO YOU HAVE A DRINK CONTAINING ALCOHOL: NEVER
HOW MANY STANDARD DRINKS CONTAINING ALCOHOL DO YOU HAVE ON A TYPICAL DAY: PATIENT DOES NOT DRINK

## 2025-04-22 NOTE — RESULT ENCOUNTER NOTE
No sign of any unusual protein in your urine which is reassuring. We will check this periodically and continue with good blood pressure and sugar control. Let me know if any questions Thanks DR SULLIVAN

## 2025-08-26 ENCOUNTER — HOSPITAL ENCOUNTER (OUTPATIENT)
Dept: GENERAL RADIOLOGY | Age: 70
Discharge: HOME | End: 2025-08-28
Payer: MEDICARE

## 2025-08-26 ENCOUNTER — OFFICE VISIT (OUTPATIENT)
Dept: FAMILY MEDICINE CLINIC | Age: 70
End: 2025-08-26
Payer: MEDICARE

## 2025-08-26 VITALS
WEIGHT: 232 LBS | DIASTOLIC BLOOD PRESSURE: 77 MMHG | HEIGHT: 71 IN | OXYGEN SATURATION: 96 % | SYSTOLIC BLOOD PRESSURE: 122 MMHG | BODY MASS INDEX: 32.48 KG/M2 | RESPIRATION RATE: 18 BRPM | TEMPERATURE: 97.5 F | HEART RATE: 60 BPM

## 2025-08-26 DIAGNOSIS — N40.1 BENIGN PROSTATIC HYPERPLASIA WITH NOCTURIA: ICD-10-CM

## 2025-08-26 DIAGNOSIS — E11.9 TYPE 2 DIABETES MELLITUS WITHOUT COMPLICATION, WITHOUT LONG-TERM CURRENT USE OF INSULIN (HCC): Primary | ICD-10-CM

## 2025-08-26 DIAGNOSIS — L82.1 SEBORRHEIC KERATOSIS: ICD-10-CM

## 2025-08-26 DIAGNOSIS — G89.29 ELBOW PAIN, CHRONIC, LEFT: ICD-10-CM

## 2025-08-26 DIAGNOSIS — R35.1 BENIGN PROSTATIC HYPERPLASIA WITH NOCTURIA: ICD-10-CM

## 2025-08-26 DIAGNOSIS — K42.9 UMBILICAL HERNIA WITHOUT OBSTRUCTION AND WITHOUT GANGRENE: ICD-10-CM

## 2025-08-26 DIAGNOSIS — Z12.5 SCREENING FOR PROSTATE CANCER: ICD-10-CM

## 2025-08-26 DIAGNOSIS — M15.0 PRIMARY OSTEOARTHRITIS INVOLVING MULTIPLE JOINTS: ICD-10-CM

## 2025-08-26 DIAGNOSIS — M25.522 ELBOW PAIN, CHRONIC, LEFT: ICD-10-CM

## 2025-08-26 DIAGNOSIS — E66.811 CLASS 1 DRUG-INDUCED OBESITY WITHOUT SERIOUS COMORBIDITY WITH BODY MASS INDEX (BMI) OF 32.0 TO 32.9 IN ADULT: ICD-10-CM

## 2025-08-26 DIAGNOSIS — E66.1 CLASS 1 DRUG-INDUCED OBESITY WITHOUT SERIOUS COMORBIDITY WITH BODY MASS INDEX (BMI) OF 32.0 TO 32.9 IN ADULT: ICD-10-CM

## 2025-08-26 PROBLEM — D22.72 MELANOCYTIC NEVUS OF LEFT LOWER EXTREMITY: Status: ACTIVE | Noted: 2025-05-13

## 2025-08-26 PROBLEM — M19.90 ARTHRITIS: Status: ACTIVE | Noted: 2025-05-13

## 2025-08-26 PROBLEM — J30.9 ALLERGIC RHINITIS: Status: RESOLVED | Noted: 2022-02-25 | Resolved: 2025-08-26

## 2025-08-26 LAB
HBA1C MFR BLD: 6.4 %
PROSTATE SPECIFIC ANTIGEN: 2.03 NG/ML (ref 0–4)

## 2025-08-26 PROCEDURE — 2022F DILAT RTA XM EVC RTNOPTHY: CPT | Performed by: FAMILY MEDICINE

## 2025-08-26 PROCEDURE — 3017F COLORECTAL CA SCREEN DOC REV: CPT | Performed by: FAMILY MEDICINE

## 2025-08-26 PROCEDURE — G8427 DOCREV CUR MEDS BY ELIG CLIN: HCPCS | Performed by: FAMILY MEDICINE

## 2025-08-26 PROCEDURE — 1123F ACP DISCUSS/DSCN MKR DOCD: CPT | Performed by: FAMILY MEDICINE

## 2025-08-26 PROCEDURE — 1159F MED LIST DOCD IN RCRD: CPT | Performed by: FAMILY MEDICINE

## 2025-08-26 PROCEDURE — 1036F TOBACCO NON-USER: CPT | Performed by: FAMILY MEDICINE

## 2025-08-26 PROCEDURE — 83036 HEMOGLOBIN GLYCOSYLATED A1C: CPT | Performed by: FAMILY MEDICINE

## 2025-08-26 PROCEDURE — 73080 X-RAY EXAM OF ELBOW: CPT

## 2025-08-26 PROCEDURE — G8417 CALC BMI ABV UP PARAM F/U: HCPCS | Performed by: FAMILY MEDICINE

## 2025-08-26 PROCEDURE — 99214 OFFICE O/P EST MOD 30 MIN: CPT | Performed by: FAMILY MEDICINE

## 2025-08-26 PROCEDURE — 3044F HG A1C LEVEL LT 7.0%: CPT | Performed by: FAMILY MEDICINE

## (undated) DEVICE — CONTAINER SPEC 60ML PH 7NEUTRAL BUFF FRMLN RDY TO USE

## (undated) DEVICE — FORCEPS BX L240CM JAW DIA2.4MM WRK CHN 2.8MM ORNG L CAP W/

## (undated) DEVICE — DEFENDO AIR WATER SUCTION AND BIOPSY VALVE KIT FOR  OLYMPUS: Brand: DEFENDO AIR/WATER/SUCTION AND BIOPSY VALVE

## (undated) DEVICE — GAUZE,SPONGE,4"X4",8PLY,STRL,LF,10/TRAY: Brand: MEDLINE

## (undated) DEVICE — CONNECTOR IRRIGATION AUXILIARY H2O JET W/ PRT MTL THRD HYDR